# Patient Record
Sex: FEMALE | Race: OTHER | Employment: UNEMPLOYED | URBAN - METROPOLITAN AREA
[De-identification: names, ages, dates, MRNs, and addresses within clinical notes are randomized per-mention and may not be internally consistent; named-entity substitution may affect disease eponyms.]

---

## 2017-04-27 ENCOUNTER — OFFICE VISIT (OUTPATIENT)
Dept: FAMILY MEDICINE CLINIC | Age: 62
End: 2017-04-27

## 2017-04-27 ENCOUNTER — HOSPITAL ENCOUNTER (OUTPATIENT)
Dept: LAB | Age: 62
Discharge: HOME | End: 2017-04-27

## 2017-04-27 VITALS
HEART RATE: 63 BPM | BODY MASS INDEX: 29.64 KG/M2 | HEIGHT: 60 IN | DIASTOLIC BLOOD PRESSURE: 66 MMHG | WEIGHT: 151 LBS | SYSTOLIC BLOOD PRESSURE: 144 MMHG | TEMPERATURE: 98.3 F

## 2017-04-27 DIAGNOSIS — Z79.4 TYPE 2 DIABETES MELLITUS WITHOUT COMPLICATION, WITH LONG-TERM CURRENT USE OF INSULIN (HCC): ICD-10-CM

## 2017-04-27 DIAGNOSIS — R42 VERTIGO: ICD-10-CM

## 2017-04-27 DIAGNOSIS — E11.9 TYPE 2 DIABETES MELLITUS WITHOUT COMPLICATION, WITH LONG-TERM CURRENT USE OF INSULIN (HCC): ICD-10-CM

## 2017-04-27 DIAGNOSIS — R73.9 ELEVATED BLOOD SUGAR: Primary | ICD-10-CM

## 2017-04-27 DIAGNOSIS — L08.9 LOCAL SKIN INFECTION: ICD-10-CM

## 2017-04-27 LAB
EST. AVERAGE GLUCOSE BLD GHB EST-MCNC: 186 MG/DL
GLUCOSE POC: NORMAL MG/DL
HBA1C MFR BLD: 8.1 % (ref 4.2–6.3)

## 2017-04-27 PROCEDURE — 83036 HEMOGLOBIN GLYCOSYLATED A1C: CPT | Performed by: PHYSICIAN ASSISTANT

## 2017-04-27 RX ORDER — METFORMIN HYDROCHLORIDE 500 MG/1
500 TABLET ORAL 2 TIMES DAILY WITH MEALS
Qty: 60 TAB | Refills: 2 | Status: SHIPPED | OUTPATIENT
Start: 2017-04-27 | End: 2017-12-05 | Stop reason: SDUPTHER

## 2017-04-27 RX ORDER — MECLIZINE HYDROCHLORIDE 25 MG/1
25 TABLET ORAL
Qty: 30 TAB | Refills: 1 | Status: SHIPPED | OUTPATIENT
Start: 2017-04-27 | End: 2017-05-07

## 2017-04-27 RX ORDER — CEPHALEXIN 500 MG/1
500 CAPSULE ORAL 4 TIMES DAILY
Qty: 40 CAP | Refills: 0 | Status: SHIPPED | OUTPATIENT
Start: 2017-04-27 | End: 2017-05-07

## 2017-04-27 NOTE — PROGRESS NOTES
The following was performed at discharge station per provider with the assistance of , Han Negrete:    AVS printed, reviewed with pt and given to pt. RN called Raymond Whaley at [employer], and ordered more insulin for pt. Pt stated she has been buying the insulin. Yasir Escobar advised that pt has one more refill and also that pt will need to send in her updated income verification by July, 2017. Pt was advised to send a copy of her income tax return to Yasir Escobar and she was given the address to send this to. Pt has gone to registration for an appt in 3 months. Pt expressed understanding of the above information. Lacey Drummond.

## 2017-04-27 NOTE — PROGRESS NOTES
Assessment/Plan:    José Grady was seen today for diabetes. Diagnoses and all orders for this visit:    Elevated blood sugar  -     AMB POC GLUCOSE BLOOD, BY GLUCOSE MONITORING DEVICE    Type 2 diabetes mellitus without complication, with long-term current use of insulin (HCC)  -     HEMOGLOBIN A1C WITH EAG; Future  -     metFORMIN (GLUCOPHAGE) 500 mg tablet; Take 1 Tab by mouth two (2) times daily (with meals). (yaron dos veces al chiki)  -     insulin NPH (NOVOLIN N, HUMULIN N) 100 unit/mL injection; 20 units at bid    Local skin infection  -     cephALEXin (KEFLEX) 500 mg capsule; Take 1 Cap by mouth four (4) times daily for 10 days. [place] pastilla 4 veces al chiki por 10 tolliver    Vertigo  -     meclizine (ANTIVERT) 25 mg tablet; Take 1 Tab by mouth three (3) times daily as needed for up to 10 days. [place]ner pastilla cada 8 horas por swift mareo    Suspect that her a1c is probably higher than her last visit 8 months ago which may be contributing to her sxs of not feeling well  Will follow for results, may need to be seen before 3 months  Order insulin through PAP  Restart metformin  Continue lovastatin    Follow-up Disposition:  Return in about 3 months (around 7/27/2017), or if symptoms worsen or fail to improve. MONROE Burdick expressed understanding of this plan. An AVS was printed and given to the patient.      ----------------------------------------------------------------------    Chief Complaint   Patient presents with    Diabetes     f/u, med refill       History of Present Illness:  Not able to be seen for 8 months bc she would come and the clinic would already be full. Has continued taking her insulin usually 20 units bid. Night time blood sugar usually no higher than 140 and morning FBS 90's. When she is in the 90's she doesn't feel well so she holds the insulin until after breakfast and then uses it. She is off the metformin b/c she didn't have refills.  She states that she has a huge supply of lovastatin and states that she is taking this nightly. She \"doens't feel well\". For 3 months she has felt dizzy. She does have a hx of seasonal allergies for which she is not taking any medication. The dizziness is like the room is spinning around her. She continues to work full time at the [employer] and asks for me to write her a note to her boss to ensure that she has adequate breaks for snacks and checking her blood sugar, etc.     She has several spots on her legs that were first pustules (and one was on her labia but has resolved) and they are now drying up but she had induration and redness for a long time. She has one on each thigh. She states that initially they caused her thighs to be swollen. She doesn't know of any bite. She has been using purell type cleansers on them but they have not resolved. Past Medical History:   Diagnosis Date    Candidiasis of other urogenital sites 1/11/2013    Depression     past history    Joint pain 4/23/2014    Hip, left hand, ankles    Medial epicondylitis of elbow 1/11/2013    Other and unspecified hyperlipidemia 1/11/2013    Type II or unspecified type diabetes mellitus without mention of complication, not stated as uncontrolled 1/11/2013       Current Outpatient Prescriptions   Medication Sig Dispense Refill    meclizine (ANTIVERT) 25 mg tablet Take 1 Tab by mouth three (3) times daily as needed for up to 10 days. [place] 1 pastilla cada 8 horas por swift mareo 30 Tab 1    cephALEXin (KEFLEX) 500 mg capsule Take 1 Cap by mouth four (4) times daily for 10 days. [place] 1 pastilla 4 veces al chiki por 10 tolliver 40 Cap 0    metFORMIN (GLUCOPHAGE) 500 mg tablet Take 1 Tab by mouth two (2) times daily (with meals). (yaron dos veces al chiki) 60 Tab 2    insulin NPH (NOVOLIN N, HUMULIN N) 100 unit/mL injection 20 units at bid 1 Vial 0    lovastatin (MEVACOR) 20 mg tablet Take 2 Tabs by mouth nightly.  180 Tab 1    naproxen (NAPROSYN) 500 mg tablet Take 1 Tab by mouth two (2) times daily (with meals). [place] 1 [employer] veces al chiki con comida 60 Tab 1    acetaminophen (TYLENOL ARTHRITIS) 650 mg CR tablet 2 bid prn. [employer] veces al chiki si necesita para dolor. 1 Each 5       No Known Allergies    Social History   Substance Use Topics    Smoking status: Never Smoker    Smokeless tobacco: Not on file    Alcohol use No       No family history on file. Physical Exam:     Visit Vitals    /66 (BP 1 Location: Right arm, BP Patient Position: Sitting)    Pulse 63    Temp 98.3 °F (36.8 °C) (Oral)    Ht 5' 0.43\" (1.535 m)    Wt 151 lb (68.5 kg)    BMI 29.07 kg/m2     Looks well, pleasant  A&Ox3  WDWN NAD  Respirations normal and non labored  Neuro CN 2-12 grossly intact  TM's alesia with clear fluid bulge  Skin- she has one flat red macular appearing lesion on  Each anterior thigh. There is minimal induration and there is surrounding erythema. There is no acute pus.  The thighs are not obviously swollen    Lab Results   Component Value Date/Time    Glucose 89 02/18/2016 08:54 AM    Glucose  non fasting 04/27/2017 09:49 AM     Lab Results   Component Value Date/Time    Cholesterol, total 182 02/18/2016 08:54 AM    HDL Cholesterol 56 02/18/2016 08:54 AM    LDL,Direct 120 08/16/2012 12:23 PM    LDL, calculated 115 02/18/2016 08:54 AM    VLDL, calculated 11 02/18/2016 08:54 AM    Triglyceride 55 02/18/2016 08:54 AM    CHOL/HDL Ratio 3.3 02/18/2016 08:54 AM     Lab Results   Component Value Date/Time    Sodium 142 02/18/2016 08:54 AM    Potassium 4.2 02/18/2016 08:54 AM    Chloride 107 02/18/2016 08:54 AM    CO2 27 02/18/2016 08:54 AM    Anion gap 8 02/18/2016 08:54 AM    Glucose 89 02/18/2016 08:54 AM    BUN 14 02/18/2016 08:54 AM    Creatinine 0.57 02/18/2016 08:54 AM    BUN/Creatinine ratio 25 02/18/2016 08:54 AM    GFR est AA >60 02/18/2016 08:54 AM    GFR est non-AA >60 02/18/2016 08:54 AM    Calcium 8.7 02/18/2016 08:54 AM    Bilirubin, total 0.4 02/18/2016 08:54 AM    AST (SGOT) 12 02/18/2016 08:54 AM    Alk.  phosphatase 76 02/18/2016 08:54 AM    Protein, total 6.9 02/18/2016 08:54 AM    Albumin 3.9 02/18/2016 08:54 AM    Globulin 3.0 02/18/2016 08:54 AM    A-G Ratio 1.3 02/18/2016 08:54 AM    ALT (SGPT) 22 02/18/2016 08:54 AM       Lab Results   Component Value Date/Time    Hemoglobin A1c 8.8 08/23/2016 11:19 AM

## 2017-04-27 NOTE — PATIENT INSTRUCTIONS
Vértigo: Instrucciones de cuidado - [ Vertigo: Care Instructions ]  Instrucciones de cuidado  El vértigo es alise sensación de que usted o el ambiente que le rodea se mueve cuando no es así. Con frecuencia se describe jami alise sensación de girar, ryley vueltas, caer o inclinarse. El vértigo podría hacer que vomite o sienta náuseas. Podría tener dificultades para caminar o estar de pie y podría perder el equilibrio. El vértigo es a [employer] relacionado con un problema del oído interno, yuri puede tener otras causas más serias. Si el vértigo continúa, usted podría necesitar más pruebas para hallar swift causa. La atención de seguimiento es alise parte clave de swift tratamiento y seguridad. Asegúrese de hacer y acudir a todas las citas, y llame a swift médico si está teniendo problemas. También es alise buena idea saber los resultados de los exámenes y mantener alise lista de los medicamentos que karen. ¿Cómo puede cuidarse en el hogar? · No se acueste boca arriba. Elévese un poco. [place] podría reducir la sensación de girar. Mantenga los ojos abiertos. · Muévase despacio para no caer. · Si swift médico le recomienda algún medicamento, tómelo exactamente según las indicaciones. · No conduzca cuando tenga vértigo. Ciertos ejercicios, conocidos jami ejercicios de [place], pueden ayudar a disminuir el vértigo. Para hacer los ejercicios de [place]:  · Siéntese al borde de alise cama o un sofá y acuéstese rápido del lado que le causa el peor vértigo. Acuéstese de lado, sobre el oído [place]. · Quédese en demetrio posición glen por lo menos 30 segundos o hasta que el vértigo pase. · Siéntese. Si esto le causa vértigo, espere a que pase. · Repita bert procedimiento del otro lado. · Repita esto 10 veces. Francis estos ejercicios 2 veces al día hasta que la sensación de vértigo desaparezca. ¿Cuándo debe pedir ayuda? Llame al 911 en cualquier momento que considere que necesita atención de emergencia.  Por ejemplo, llame si:  · Se desmayó (perdió el conocimiento). · Tiene síntomas de un ataque cerebral. Estos podrían incluir:  ¨ Entumecimiento, hormigueo, debilitamiento o pérdida de movimiento repentinos en la sohan, el brazo o la pierna, sobre todo si ocurre en un solo lado del cuerpo. ¨ Cambios repentinos en la visión. ¨ Dificultades repentinas para hablar. ¨ Confusión repentina o dificultad para comprender frases sencillas. ¨ Problemas repentinos para caminar o mantener el equilibrio. ¨ Dolor de [place] intenso y repentino, distinto de los neftali de buck anteriores. Llame a swift médico ahora mismo o busque atención médica inmediata si:  · Tiene vértigo junto con fiebre, dolor de buck o zumbido en los oídos. · Tiene náuseas o vómito nuevos o éstos aumentan. Preste especial atención a los cambios en swift riki y asegúrese de comunicarse con swift médico si:  · El vértigo empeora u ocurre con mayor frecuencia. · El vértigo no mejora después de 2 semanas. ¿Dónde puede encontrar más información en inglés? Mandy Roy a http://my-saúl.info/. Jackson Garcia [id number] en la búsqueda para aprender más acerca de \"Vértigo: Instrucciones de cuidado - [ Vertigo: Care Instructions ]. \"  Revisado: 29 julio, 2016  Versión del contenido: 11.2  © 7904-6902 Healthwise, Incorporated. Las instrucciones de cuidado fueron adaptadas bajo licencia por [employer] (which disclaims liability or warranty for this information). Si usted tiene [place] afección médica o sobre estas instrucciones, siempre pregunte a swift profesional de riki. Healthwise, Incorporated niega toda garantía o responsabilidad por swift uso de esta información.

## 2017-04-27 NOTE — PROGRESS NOTES
Coordination of Care  1. Have you been to the ER, urgent care clinic since your last visit? Hospitalized since your last visit? No    2. Have you seen or consulted any other health care providers outside of the [address] [place] [address] since your last visit? Include any pap smears or colon screening.  No    Medications  Medication Reconciliation Performed: no  Patient does need refills     Learning Assessment Complete? yes  Results for orders placed or performed in visit on 04/27/17   AMB POC GLUCOSE BLOOD, BY GLUCOSE MONITORING DEVICE   Result Value Ref Range    Glucose  non fasting mg/dL

## 2017-05-08 ENCOUNTER — DOCUMENTATION ONLY (OUTPATIENT)
Dept: FAMILY MEDICINE CLINIC | Age: 62
End: 2017-05-08

## 2017-05-08 ENCOUNTER — TELEPHONE (OUTPATIENT)
Dept: FAMILY MEDICINE CLINIC | Age: 62
End: 2017-05-08

## 2017-05-08 NOTE — TELEPHONE ENCOUNTER
Telephone call made to the patient. Her , Que George, who is listed on the PHI, answered the call and stated the patient was at work. He offered to  the patient's insulin tomorrow at [facility] as a [facility] and knows to come between 9 am and 3:30 pm if possible.  Dora Guzman RN

## 2017-05-08 NOTE — PROGRESS NOTES
Receipt of patient's PAP insulin order of 5 vials of Humulin N has been logged into the log book. Routing to the provider for dose confirmation and permission to deliver to the patient. Estella Rutledge RN  Tracking Events for the Last 12 Hours   24 Hours   48 Hours   No recent dispensing events. insulin NPH (NOVOLIN N, HUMULIN N) 100 unit/mL injection [[id number]]   Order Details   Dose, Route, Frequency: As Directed    Dispense Quantity:  1 Vial Refills:  0 Fills Remaining:  --           Si units at bid          Written Date:  17 Expiration Date:  --     Start Date:  17 End Date:  --            Ordering Provider:  -- JULIA #:  -- NPI:  --    Authorizing Provider:   MARCOS Stewart JULIA #:  [id number] NPI:  [id number]    Diagnosis Association: Type 2 diabetes mellitus without complication, with long-term current use of insulin (HCC) (E11.9 , Z79.4)      Original Order:  insulin NPH (NOVOLIN N, HUMULIN N) 100 unit/mL injection [[id number]]      Pharmacy:  [address] #:  --     Pharmacy Comments:  --          Quantity Remaining:  -- Quantity Filled:  --        Priority and Order Details   Priority Class      Routine Normal    Warnings History   No Interaction Warnings [address],[facility], [facility] [address]   Order [place]   Number of times this order has been changed since signin   Order [address] [place]   Order History   Outpatient   Date/Time Action Taken User Additional Information   17 1040 Sign MARCOS Hernandez Reorder from Order: [id number]   17 1040 Taking [address] [place] [address] MARCOS Edwards    Destination   This Medication Went To:   [facility] [[id number]]   Medication Detail      Disp Refills Start End      insulin NPH (NOVOLIN N, HUMULIN N) 100 unit/mL injection 1 Vial 0 2017     Si units at bid    E-Prescribing Status: Receipt confirmed by pharmacy (4/27/2017 10:40 AM EDT)

## 2017-05-09 ENCOUNTER — CLINICAL SUPPORT (OUTPATIENT)
Dept: FAMILY MEDICINE CLINIC | Age: 62
End: 2017-05-09

## 2017-05-09 DIAGNOSIS — Z79.899 MEDICATION MANAGEMENT: Primary | ICD-10-CM

## 2017-05-09 NOTE — PROGRESS NOTES
Patient here to  the NPH insulin,  pap medication, patient did verbalize that she is to call [facility] for refills when she is down to two vials left, patient aware of this process. No further questions.

## 2017-11-01 ENCOUNTER — OFFICE VISIT (OUTPATIENT)
Dept: FAMILY PLANNING/WOMEN'S HEALTH CLINIC | Age: 62
End: 2017-11-01

## 2017-11-01 ENCOUNTER — HOSPITAL ENCOUNTER (OUTPATIENT)
Dept: MAMMOGRAPHY | Age: 62
Discharge: HOME | End: 2017-11-01

## 2017-11-01 ENCOUNTER — HOSPITAL ENCOUNTER (OUTPATIENT)
Dept: LAB | Age: 62
Discharge: HOME | End: 2017-11-01

## 2017-11-01 VITALS — DIASTOLIC BLOOD PRESSURE: 60 MMHG | HEART RATE: 70 BPM | SYSTOLIC BLOOD PRESSURE: 143 MMHG

## 2017-11-01 DIAGNOSIS — Z79.4 TYPE 2 DIABETES MELLITUS WITHOUT COMPLICATION, WITH LONG-TERM CURRENT USE OF INSULIN (HCC): ICD-10-CM

## 2017-11-01 DIAGNOSIS — L02.211 CUTANEOUS ABSCESS OF ABDOMINAL WALL: Primary | ICD-10-CM

## 2017-11-01 DIAGNOSIS — Z01.419 ENCOUNTER FOR WELL WOMAN EXAM: ICD-10-CM

## 2017-11-01 DIAGNOSIS — E11.9 TYPE 2 DIABETES MELLITUS WITHOUT COMPLICATION, WITH LONG-TERM CURRENT USE OF INSULIN (HCC): ICD-10-CM

## 2017-11-01 DIAGNOSIS — Z01.419 WELL WOMAN EXAM WITH ROUTINE GYNECOLOGICAL EXAM: ICD-10-CM

## 2017-11-01 DIAGNOSIS — Z12.31 VISIT FOR SCREENING MAMMOGRAM: ICD-10-CM

## 2017-11-01 PROCEDURE — 88175 CYTOPATH C/V AUTO FLUID REDO: CPT | Performed by: PHYSICIAN ASSISTANT

## 2017-11-01 PROCEDURE — 77067 SCR MAMMO BI INCL CAD: CPT

## 2017-11-01 RX ORDER — CEPHALEXIN 500 MG/1
500 CAPSULE ORAL 3 TIMES DAILY
Qty: 30 CAP | Refills: 0 | Status: SHIPPED | OUTPATIENT
Start: 2017-11-01 | End: 2018-06-09 | Stop reason: ALTCHOICE

## 2017-11-01 NOTE — Clinical Note
Please schedule pt a f/u appt at [facility], she needs to be seen for several reasons.  Thank you= may be out to a month from now

## 2017-11-01 NOTE — PROGRESS NOTES
EVERY WOMANS LIFE HISTORY QUESTIONNAIRE       No Yes Comments   Has a doctor ever seen or felt anything wrong with your breast? []                                  [x]                                  Last year   Have you ever had a breast biopsy? [x]                                  []                                          When and where was last [facility]ogram performed? [facility], [facility]    Have you ever been told that there was a problem on your mammogram?   No Yes Comments   []                                  [x]                                       Do you have breast implants? No Yes Comments   [x]                                  []                                       When was your last Pap test performed? 2016, [facility]    Have you ever had an abnormal Pap test?   No Yes Comments   [x]                                  []                                       Have you had a hysterectomy? No Yes Comments (why)   [x]                                  []                                       Have you ever been diagnosed with any type of Cancer   No Yes Comments (type,when,where,type of treatment   [x]                                  []                                          Has a family member been diagnosed with breast or ovarian cancer? No Yes Comments (which family members, and type   [x]                                  []                                       Did your mother take CASSY? No Yes Unknown   []                                  []                                  unknown     Do you have a history of HIV exposure? No Yes    [x]                                  []                                         Have you been through menopause?    No Yes Date of LMP   []                                  [x]                                  8 years ago     Are you taking hormone replacement therapy (HRT)     No Yes Comments   [x]                                  []                                       How many times have you been pregnant? 5        Number of live births ? 5    Are you experiencing any of the following? No Yes Comments   Nipple Discharge [x]                                  []                                     Breast Lump/Masses [x]                                  []                                     Breast Skin Changes [x]                                  []                                          No Yes Comments   Vaginal Discharge [x]                                  []                                     Abnormal/unusual vaginal bleeding [x]                                  []                                         Are you experiencing any other health problems? DM        Pt will need pap result letter in [address] [facility]
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
## 2017-11-07 ENCOUNTER — TELEPHONE (OUTPATIENT)
Dept: FAMILY MEDICINE CLINIC | Age: 62
End: 2017-11-07

## 2017-11-07 ENCOUNTER — DOCUMENTATION ONLY (OUTPATIENT)
Dept: FAMILY PLANNING/WOMEN'S HEALTH CLINIC | Age: 62
End: 2017-11-07

## 2017-11-07 NOTE — TELEPHONE ENCOUNTER
Per provider Sonia RODRÍGUEZ \"pt needs to return to see a medical provider\". Called placed to phone number in demographics and message left to call [facility] office nurse to schedule a f/u appt and/or return to [facility] to see a provider by \"making the line\".

## 2017-12-05 ENCOUNTER — HOSPITAL ENCOUNTER (OUTPATIENT)
Dept: LAB | Age: 62
Discharge: HOME | End: 2017-12-05

## 2017-12-05 ENCOUNTER — OFFICE VISIT (OUTPATIENT)
Dept: FAMILY MEDICINE CLINIC | Age: 62
End: 2017-12-05

## 2017-12-05 VITALS
TEMPERATURE: 97.9 F | HEART RATE: 85 BPM | BODY MASS INDEX: 29.26 KG/M2 | WEIGHT: 152 LBS | SYSTOLIC BLOOD PRESSURE: 144 MMHG | DIASTOLIC BLOOD PRESSURE: 69 MMHG

## 2017-12-05 DIAGNOSIS — E11.65 TYPE 2 DIABETES MELLITUS WITH HYPERGLYCEMIA, WITH LONG-TERM CURRENT USE OF INSULIN (HCC): Primary | ICD-10-CM

## 2017-12-05 DIAGNOSIS — Z79.4 TYPE 2 DIABETES MELLITUS WITH HYPERGLYCEMIA, WITH LONG-TERM CURRENT USE OF INSULIN (HCC): ICD-10-CM

## 2017-12-05 DIAGNOSIS — Z79.4 TYPE 2 DIABETES MELLITUS WITH HYPERGLYCEMIA, WITH LONG-TERM CURRENT USE OF INSULIN (HCC): Primary | ICD-10-CM

## 2017-12-05 DIAGNOSIS — Z13.9 ENCOUNTER FOR SCREENING: ICD-10-CM

## 2017-12-05 DIAGNOSIS — E11.65 TYPE 2 DIABETES MELLITUS WITH HYPERGLYCEMIA, WITH LONG-TERM CURRENT USE OF INSULIN (HCC): ICD-10-CM

## 2017-12-05 LAB — GLUCOSE POC: NORMAL MG/DL

## 2017-12-05 PROCEDURE — 82043 UR ALBUMIN QUANTITATIVE: CPT | Performed by: NURSE PRACTITIONER

## 2017-12-05 PROCEDURE — 83036 HEMOGLOBIN GLYCOSYLATED A1C: CPT | Performed by: NURSE PRACTITIONER

## 2017-12-05 PROCEDURE — 86803 HEPATITIS C AB TEST: CPT | Performed by: NURSE PRACTITIONER

## 2017-12-05 RX ORDER — GLIPIZIDE 5 MG/1
TABLET ORAL
Qty: 30 TAB | Refills: 3 | Status: SHIPPED | OUTPATIENT
Start: 2017-12-05 | End: 2018-02-27 | Stop reason: SDUPTHER

## 2017-12-05 RX ORDER — METFORMIN HYDROCHLORIDE 500 MG/1
500 TABLET ORAL 2 TIMES DAILY WITH MEALS
Qty: 60 TAB | Refills: 2 | Status: SHIPPED | OUTPATIENT
Start: 2017-12-05 | End: 2018-02-27 | Stop reason: SDUPTHER

## 2017-12-05 NOTE — PROGRESS NOTES
Coordination of Care  1. Have you been to the ER, urgent care clinic since your last visit? Hospitalized since your last visit? No    2. Have you seen or consulted any other health care providers outside of the [address] [place] [address] since your last visit? Include any pap smears or colon screening. No    Medications  Does the patient need refills?  YES    Learning Assessment Complete? yes  Results for orders placed or performed in visit on 12/05/17   AMB POC GLUCOSE BLOOD, BY GLUCOSE MONITORING DEVICE   Result Value Ref Range    Glucose  NF mg/dL

## 2017-12-05 NOTE — PROGRESS NOTES
Assessment/Plan:       ICD-10-CM ICD-9-CM    1. Type 2 diabetes mellitus with hyperglycemia, with long-term current use of insulin (HCC) E11.65 250.00 AMB POC GLUCOSE BLOOD, BY GLUCOSE MONITORING DEVICE    Z79.4 790.29 HEMOGLOBIN A1C WITH EAG     V58.67 MICROALBUMIN, UR, RAND W/ MICROALBUMIN/CREA RATIO      glipiZIDE (GLUCOTROL) 5 mg tablet      insulin NPH (HUMULIN N) 100 unit/mL injection      metFORMIN (GLUCOPHAGE) 500 mg tablet   2. Encounter for screening Z13.9 V82.9 HEPATITIS C AB    Greater than 50% of this 25 minute visit was spent in face-to-face counseling/coordination of care regarding diabetes management. Follow-up Disposition:  Return in about 2 months (around 2/5/2018) for diabetes. Changes made:      [address] [facility] [address], [address] [place] [address]  [address] [address]  Phone: [phone] Fax: [phone]      [facility]  Subjective:     Chief Complaint   Patient presents with    Diabetes    Medication Refill    Beronica Zaman is a 57 y.o. OTHER female who speaks Maldivian. Glipizide 10 mg taking. Bid. 30 min before breakfast and dinner. Just today she ran out of insulin - last night and today. Eyes feel \"rare\" which she describes as watery at times. Work (in or outside the home): yes   Physical Activity in addition to working? no  Measuring glucoses? yes 105; that was at 4 am.  At night, 105.  140 in the afternoon. no rice, no soda, no bread. 170 in the evening. The pills do not lower her glucose. Outpatient Medications Prior to Visit   Medication Sig Dispense Refill    insulin NPH (HUMULIN N) 100 unit/mL injection Taking differently: 25 units sc qhs 20 Units by SubCUTAneous route two (2) times a day. Every morning and at bedtime 10 Vial 0    cephALEXin (KEFLEX) 500 mg capsule Take 1 Cap by mouth three (3) times daily.  Gave pt a hand written rx bc at [facility] 30 Cap 0    metFORMIN (GLUCOPHAGE) 500 mg tablet Take 1 Tab by mouth two (2) times daily (with meals). (yaron dos veces al chiki) 60 Tab 2    lovastatin (MEVACOR) 20 mg tablet Take 2 Tabs by mouth nightly. 180 Tab 1    naproxen (NAPROSYN) 500 mg tablet Take 1 Tab by mouth two (2) times daily (with meals). [place] 1 [facility] [employer] veces al chiki con comida 60 Tab 1    acetaminophen (TYLENOL ARTHRITIS) 650 mg CR tablet 2 bid prn. [employer] veces al chiki si necesita para dolor. 1 Each 5   Used Arnica leaves as a tea. No facility-administered medications prior to visit. Brought in medications? no and not all of them. Taking medications as prescribed? no (did not complete the cephalexin; taking insulin differently)  ROS:   no polyuria or polydipsia, no chest pain, dyspnea or TIA's, no numbness, tingling or pain in extremities. At times notices watery eyes. Social History: She reports that she has never smoked. She has never used smokeless tobacco. She reports that she does not drink alcohol or use illicit drugs. Family History: Her family history is not on file. Surgical History:   Past Surgical History:   Procedure Laterality Date    HX APPENDECTOMY      HX TUBAL LIGATION       Objective:     Vitals:    12/05/17 1354   BP: 144/69   Pulse: 85   Temp: 97.9 °F (36.6 °C)   TempSrc: Oral   Weight: 152 lb (68.9 kg)    No LMP recorded. Patient is postmenopausal.  Results for orders placed or performed in visit on 12/05/17   AMB POC GLUCOSE BLOOD, BY GLUCOSE MONITORING DEVICE   Result Value Ref Range    Glucose  NF mg/dL    She used her daughter's metformin and glipizide and these didn't lower her glucose. No insulin for a week. Wt Readings from Last 2 Encounters:   12/05/17 152 lb (68.9 kg)   04/27/17 151 lb (68.5 kg)    Weight increased; Hemoglobin A1c   Date Value Ref Range Status   04/27/2017 8.1 (H) 4.2 - 6.3 % Final   08/23/2016 8.8 (H) 4.2 - 6.3 % Final    A1C decreased;   Using 25 units at night only, 5 or 7 pm.  Not taking any sodas.     Lab Results   Component Value Date/Time    Microalbumin/Creat ratio (mg/g creat) 13 10/29/2015 10:03 AM    Microalbumin,urine random 0.56 10/29/2015 10:03 AM    Creatinine 0.57 02/18/2016 08:54 AM      Lab Results   Component Value Date/Time    GFR est AA >60 02/18/2016 08:54 AM    GFR est non-AA >60 02/18/2016 08:54 AM       Lab Results   Component Value Date/Time    Cholesterol, total 182 02/18/2016 08:54 AM    HDL Cholesterol 56 02/18/2016 08:54 AM    LDL,Direct 120 08/16/2012 12:23 PM    LDL, calculated 115 02/18/2016 08:54 AM    Triglyceride 55 02/18/2016 08:54 AM    CHOL/HDL Ratio 3.3 02/18/2016 08:54 AM      Lab Results   Component Value Date/Time    ALT (SGPT) 22 02/18/2016 08:54 AM    AST (SGOT) 12 02/18/2016 08:54 AM    Alk. phosphatase 76 02/18/2016 08:54 AM    Bilirubin, total 0.4 02/18/2016 08:54 AM     Constitutional: She appears well-developed. Eyes: EOM are normal. Pupils are equal, round, and reactive to light. Neck: Neck supple. No thyromegaly present. Cardiovascular: Normal rate, regular rhythm, normal heart sounds and intact distal pulses. No murmur heard. Pulmonary/Chest: Effort normal and breath sounds normal.   Musculoskeletal: She exhibits no edema. No ulcers of the lower extremities. Assessment/Plan:   Diagnoses and all orders for this visit:    1. Type 2 diabetes mellitus with hyperglycemia, with long-term current use of insulin (HCC)  -     AMB POC GLUCOSE BLOOD, BY GLUCOSE MONITORING DEVICE  -     HEMOGLOBIN A1C WITH EAG; Future  -     MICROALBUMIN, UR, RAND W/ MICROALBUMIN/CREA RATIO; Future  -     glipiZIDE (GLUCOTROL) 5 mg tablet; 1 po qday 30 min ac lunch; Shayy 1 por boca 30 min antes de almuerzo  -     insulin NPH (HUMULIN N) 100 unit/mL injection; 25 Units by SubCUTAneous route nightly. Trinidadian sig  -     metFORMIN (GLUCOPHAGE) 500 mg tablet; Take 1 Tab by mouth two (2) times daily (with meals). (yaron dos veces al chiki)    2. Encounter for screening  -     HEPATITIS C AB;  Future      Diabetes Mellitus type 2, under Poor control. Blood pressure under Fair control. Greater than 50% of this 25 minute visit was spent in face-to-face counseling/coordination of care regarding diabetes management. Follow-up Disposition:  Return in about 2 months (around 2/5/2018) for diabetes. Changes made:   Lyla Bateman DNP, FNP-BC, BC-ADM  Amrit Patrick expressed understanding of this plan. Massimo Aponte is her daughter. Daughter doesn't have much hunger.

## 2017-12-05 NOTE — PROGRESS NOTES
Printed AVS, provided to pt and reviewed. Pt indicated understanding and had no questions. Told pt that rx's have been sent to pharmacy and they should be ready for  in approximately 2 hrs. All the medications ordered today were reviewed with the pt. The pt stated she used to get her insulin for free. She had not received any insulin from the PAP program since April. The pt stated she had bought 1 bottle of insulin. The pt was given one vial of stock insulin NPH. The dose was reviewed with the pt. PAP Coordinator Abhi Starks RN, was notified. Estella emailed Anabelle Pantoja for a refill on the pt's insulin. The pt was instructed she was supposed to call Anabelle Pantoja at the PAP program and let her know when she needed more insulin before she ran out of insulin. At least 4-6 weeks prior to running out of insulin. The pt was instructed to finish her Keflex she had at home. Per the provider she is to take 1 capsule 3 times a day by mouth until they are gone. Pt verbalized understanding and denied further questions. Thong Marquis was the .  Felicitas Rolon RN

## 2017-12-06 LAB
CREAT UR-MCNC: 43.4 MG/DL
EST. AVERAGE GLUCOSE BLD GHB EST-MCNC: 151 MG/DL
HBA1C MFR BLD: 6.9 % (ref 4.2–6.3)
HCV AB SERPL QL IA: NONREACTIVE
HCV COMMENT,HCGAC: NORMAL
MICROALBUMIN UR-MCNC: <0.5 MG/DL
MICROALBUMIN/CREAT UR-RTO: NORMAL MG/G (ref 0–30)

## 2017-12-06 NOTE — PROGRESS NOTES
A1c at goal; this is consistent with her measurements. No change to plan. Check cbc at follow up, review POC measurements after lunch to make sure glipizide 5mg not causing lows.

## 2017-12-26 ENCOUNTER — DOCUMENTATION ONLY (OUTPATIENT)
Dept: FAMILY MEDICINE CLINIC | Age: 62
End: 2017-12-26

## 2017-12-26 NOTE — PROGRESS NOTES
Receipt of patient's PAP insulin order of 5 vials of Humulin N, 100/ml, 10ml/vial, has been logged into the log book. Routing to the provider for dose confirmation and permission to deliver to the patient. Estella Marc RN    insulin NPH (HUMULIN N) 100 unit/mL injection [[id number]]   Order Details   Dose: 25 Units Route: SubCUTAneous Frequency: EVERY BEDTIME   Dispense Quantity:  10 Vial Refills:  0 Fills Remaining:  --           Si Units by SubCUTAneous route nightly.  Kyrgyz sig          Written Date:  17 Expiration Date:  --     Start Date:  17 End Date:  --            Ordering Provider:  -- JULIA #:  -- NPI:  --    Authorizing Provider:  Jarret Carrillo NP JULIA #:  [id number] NPI:  [id number]    Ordering User:  Jarret Carrillo NP            Diagnosis Association: Type 2 diabetes mellitus with hyperglycemia, with long-term current use of insulin (HCC) (E11.65 , Z79.4)

## 2018-01-03 NOTE — PROGRESS NOTES
Telephone call made to the patient with assistance from [employer]  # [id number]. Calling to schedule delivery of her PAP insulin order of 5 vials of Humulin N. There was no answer at her home/cell phone number, so a generic message was left asking her to please call the [facility] office. We then called her daughter, Jody Castillo, [phone], as listed on the [address] [place] [address] Road form. There was no answer and no opportunity to leave a message. Estella Howard RN           Dose confirmed, please deliver  Received: 1 week ago       VESNA Eckert RN       Caller: Unspecified (1 week ago)                       Previous Messages       ----- Message -----      From: Estephania Zhou RN      Sent: 12/26/2017  10:59 AM        To:  Brian Banks NP

## 2018-01-12 NOTE — PROGRESS NOTES
RN called pt with the assistance of  # [phone]. Pt would like to  her medication on 1/16/18 at [address] [place] [address]. Ainsley Jiménez Sending email to Britta Lopez RN, and Evette Ventura, nursing supervisor, to advise of pickup date.   Ras Jones RN

## 2018-01-16 ENCOUNTER — OFFICE VISIT (OUTPATIENT)
Dept: FAMILY MEDICINE CLINIC | Age: 63
End: 2018-01-16

## 2018-01-16 VITALS
SYSTOLIC BLOOD PRESSURE: 136 MMHG | BODY MASS INDEX: 29.45 KG/M2 | TEMPERATURE: 97.7 F | WEIGHT: 150 LBS | HEART RATE: 68 BPM | HEIGHT: 60 IN | DIASTOLIC BLOOD PRESSURE: 57 MMHG

## 2018-01-16 DIAGNOSIS — Z13.9 ENCOUNTER FOR SCREENING: ICD-10-CM

## 2018-01-16 DIAGNOSIS — Z23 ENCOUNTER FOR IMMUNIZATION: ICD-10-CM

## 2018-01-16 DIAGNOSIS — G56.02 CARPAL TUNNEL SYNDROME OF LEFT WRIST: ICD-10-CM

## 2018-01-16 DIAGNOSIS — Z79.4 TYPE 2 DIABETES MELLITUS WITH HYPERGLYCEMIA, WITH LONG-TERM CURRENT USE OF INSULIN (HCC): Primary | ICD-10-CM

## 2018-01-16 DIAGNOSIS — E11.65 TYPE 2 DIABETES MELLITUS WITH HYPERGLYCEMIA, WITH LONG-TERM CURRENT USE OF INSULIN (HCC): Primary | ICD-10-CM

## 2018-01-16 PROBLEM — F33.9 RECURRENT DEPRESSION (HCC): Status: ACTIVE | Noted: 2018-01-16

## 2018-01-16 LAB
GLUCOSE POC: NORMAL MG/DL
HGB BLD-MCNC: 11.6 G/DL

## 2018-01-16 NOTE — MR AVS SNAPSHOT
[address] [facility] 
 
 
 [address] [facility] [address] 
[phone] Patient: Chriss Griffin MRN: [id number] CMI:5/55/1804 Visit Information Minerva hu Ferrelladiapranav Personal Médico Departamento Teléfono del Dep. Número de visita 1/16/2018  8:30 AM [facility] [phone] Follow-up Instructions Return in about 6 weeks (around 2/27/2018) for diabetes, left hand pain. Your Appointments 2/27/2018 12:30 PM  
[facility] Appt Note: Follow up Andrew 66 per LK; by n [address] [place] [address] [address] [place] [address]  
[phone]  
  
   
 [address] [place] [address] Upcoming Health Maintenance Date Due  
 EYE EXAM RETINAL OR DILATED Q1 5/27/1963 DTaP/Tdap/Td series (1 - Tdap) 5/27/1974 FOBT Q 1 YEAR AGE 50-75 5/27/2003 ZOSTER VACCINE AGE 60> 3/27/2013 LIPID PANEL Q1 2/18/2017 Influenza Age 5 to Adult 8/1/2017 FOOT EXAM Q1 8/23/2017 HEMOGLOBIN A1C Q6M 6/5/2018 MICROALBUMIN Q1 12/5/2018 BREAST CANCER SCRN MAMMOGRAM 11/1/2019 PAP AKA CERVICAL CYTOLOGY 11/1/2020 Alergias  Review Complete El: 1/16/2018 Por: VESNA Mosquera del:  1/16/2018 No Known Allergies Vacunas actuales Revisadas el:  6/30/2016 Alroy Randee Influenza Vaccine 3/1/2014, 1/8/2013 Influenza Vaccine (Quad) PF 11/19/2015, 11/4/2014 Pneumococcal Polysaccharide (PPSV-23) 6/30/2016 No revisadas esta visita You Were Diagnosed With   
  
 Sandhya Coho Type 2 diabetes mellitus with hyperglycemia, with long-term current use of insulin (HCC)    -  Primary ICD-10-CM: E11.65, Z79.4 ICD-9-CM: 250.00, 790.29, V58.67 Encounter for screening     ICD-10-CM: Z13.9 ICD-9-CM: V82.9  Carpal tunnel syndrome of left wrist     ICD-10-CM: G56.02 
 ICD-9-CM: 354.0 Partes vitales PS Pulso Temperatura Cameron ( percentil de crecimiento) Peso (percentil de crecimiento) BMI (IMC)  
 136/57 (BP 1 Location: Left arm, BP Patient Position: Sitting) 68 97.7 °F (36.5 °C) (Oral) 4' 11.65\" (1.515 m) 150 lb (68 kg) 29.64 kg/m2 Estado obstétrico Estatus de tabaquísmo Postmenopausal Never Smoker Historial de signos vitales BMI and BSA Data Body Mass Index Body Surface Area  
 29.64 kg/m 2 1.69 m 2 [facility] Phone [address] [phone] Switf lista de medicamentos actualizada Lista actualizada el: 1/16/18  9:38 AM.  Yariel Treasure use swift lista de medicamentos más reciente. acetaminophen 650 mg Tber También conocido jami:  TYLENOL ARTHRITIS  
2 bid prn. [employer] veces al chiki si necesita para dolor. cephALEXin 500 mg capsule También conocido jami:  Poly Boyers Take 1 Cap by mouth three (3) times daily. Gave pt a hand written rx bc at [facility]  
  
 glipiZIDE 5 mg tablet También conocido jami:  Marti Lakisha 1 po qday 30 min ac lunch; Shayy 1 por boca 30 min antes de almuerzo  
  
 insulin  unit/mL injection También conocido jami:  HumuLIN N  
25 Units by SubCUTAneous route nightly. Belarusian sig  
  
 lovastatin 20 mg tablet También conocido jami:  MEVACOR Take 2 Tabs by mouth nightly. metFORMIN 500 mg tablet También conocido jami:  GLUCOPHAGE Take 1 Tab by mouth two (2) times daily (with meals). (yaron dos veces al chiki)  
  
 naproxen 500 mg tablet También conocido jami:  NAPROSYN Take 1 Tab by mouth two (2) times daily (with meals). [place] 1 [employer] veces al chiki con comida Hicimos lo siguiente AMB POC GLUCOSE BLOOD, BY GLUCOSE MONITORING DEVICE [00539 CPT(R)] AMB POC HEMOGLOBIN (HGB) [12253 CPT(R)] Instrucciones de seguimiento Return in about 6 weeks (around 2/27/2018) for diabetes, left hand pain. Instrucciones para el Paciente Carpal Tunnel Wrist Splint, Left Hand 
  
Síndrome del jeanette white: Instrucciones de cuidado - [ Carpal Tunnel Syndrome: Care Instructions ] Instrucciones de cuidado El síndrome del jeanette white es un problema nervioso. Puede causar hormigueo, entumecimiento, debilidad o [employer] dedos, el pulgar y la [place]. El nervio mediano y varios tejidos fibrosos, llamados tendones, atraviesan la lamonte por un espacio denominado jeanette carpalejandra. El movimiento repetido de las tre al trabajar o practicar ciertos pasatiempos y deportes puede hacer presión sobre el nervio. El embarazo y ciertas afecciones médicas, jami la diabetes, la artritis y Esta Froid tiroides hipoactiva, también pueden causar el síndrome del jeanette white. Para reducir los síntomas, usted podría limitar [facility]. También puede dallas otras medidas para sentirse mejor. Si los síntomas son leves, es probable que pueda aliviar el dolor con 1 o 2 semanas de tratamiento en el hogar. La cirugía es necesaria solo si otros tratamientos no funcionan. La atención de seguimiento es alise parte clave de swift tratamiento y seguridad. Asegúrese de hacer y acudir a todas las citas, y llame a swift médico si está teniendo problemas. También es alise buena idea saber los resultados de los exámenes y mantener alise lista de los medicamentos que karen. Cómo puede cuidarse en el hogar? · Si es posible, interrumpa o disminuya la actividad que causa los síntomas. Si no puede suspenderla, jeremy pausas frecuentes para descansar y estirarse o cambie la posición de las tre para hacer alise tarea. Trate de Albanian [facility], jami cuando [place] el ratón de alise computadora. · Trate de evitar doblar o rotar las muñecas.  
· Pregúntele a swift médico si puede dallas un analgésico (medicamento para el dolor) de venta justin, jami acetaminofén (Tylenol), ibuprofeno (Advil, Motrin) o naproxeno (Aleve). Sea chico con los medicamentos. Reina y siga todas las instrucciones de la [place]. · Si swift médico le receta corticosteroides para ayudar a aliviar el dolor y reducir la hinchazón, tómelos exactamente jami le fueron recetados. Llame a swift médico si angel estar teniendo problemas con swift medicamento. · Colóquese hielo o alise compresa fría sobre la [place] de 10 a 20 minutos cada vez para aliviar el dolor. Póngase un paño ahuja entre el hielo y la piel. · Si swift médico o swift fisioterapeuta o terapeuta ocupacional le indica que use alise tablilla (férula) para la [place], [place] según las indicaciones para mantener la [place] en alise posición neutra. [place] también reduce la presión sobre swift nervio mediano. · Pregúntele a swift médico si debería hacer sesiones de fisioterapia o de terapia ocupacional para aprender a hacer las tareas de [employer]. · Buell clases de yoga para estirar los músculos y fortalecer las tre y las Farrah. El yoga ha [employer] síntomas del túnel carpiano. Cómo prevenir el síndrome del túnel carpiano · Cuando trabaje en la computadora, [employer] y las muñecas alineadas con los antebrazos. Mantenga los codos cerca de seven costados. Buell un descanso con alise frecuencia de 10 a 15 minutos. · Trate de hacer los siguientes ejercicios: 
¨ Calentamiento: Gire la lamonte hacia arriba, Chayo Edita y de un lado a otro. Repita esto 4 veces. Estire [employer] dedos, relájelos y vuelva a estirarlos. Repita 4 veces. Estire el pulgar doblándolo levemente hacia atrás, manténgalo en demetrio posición y relájelo. Repita 4 veces. ¨ Estiramiento con los [employer] en posición de orar: Comience colocando las margaret de las tre juntas lalo del pecho, maggi debajo del [place].  Baje las tre lentamente hacia la línea de la cintura y [address] del estómago con las margaret juntas hasta que sienta un estiramiento leve a moderado debajo de los antebrazos. Mantenga la posición entre 10 y 21 segundos. Repita 4 veces. ¨ Estiramiento del flexor de la lamonte: Extienda el brazo frente a usted, con la sales [place] arriba. Doble la lamonte y apunte con la mano hacia el piso. Con la [employer], doble con suavidad la lamonte aún más hasta que sienta un estiramiento entre leve y moderado en el antebrazo. Mantenga la posición entre 10 y 21 segundos. Repita 4 veces. ¨ Estiramiento del extensor de la lamonte: Repita los pasos para el estiramiento del flexor de la lamonte yuri comience con la sales extendida Kjarad K. · Apriete alise pelota de goma varias veces al día para mantener andreia las tre y los dedos. · Evite sostener objetos (jami un libro) en la misma posición glen mucho tiempo. Siempre que sea posible, utilice toda la mano para dallas un objeto. Si [place] solo el pulgar y el dedo índice puede tensionar la [place]. · No fume. Puede empeorar esta afección ya que reduce el flujo de curt hacia el nervio El paso. Si necesita ayuda para dejar de fumar, hable con swift médico sobre programas y medicamentos para dejar de fumar. Estos pueden aumentar seven probabilidades de dejar el hábito para siempre. Cuándo debe pedir ayuda? Preste especial atención a los cambios de swift riki y asegúrese de comunicarse con swift médico si: 
? · El dolor u otros problemas no mejoran con los cuidados en el hogar. ? · Desea más información sobre la fisioterapia o la terapia ocupacional.  
? · Tiene efectos secundarios producidos por los corticosteroides, tales jami: ¨ Aumento de [place]. ¨ Cambios en el estado de ánimo. ¨ Problemas para dormir. ¨ Fácil formación de moretones. ? · Tiene otros problemas con los medicamentos. Dónde puede encontrar más información en inglés? Sylvie Gonzalez a [email or web address]-[email or web address].[email or web address] Kristian Matter R432 en la búsqueda para aprender más acerca de \"Síndrome del túnel rosettaiano: Instrucciones de cuidado - [ Carpal Tunnel Syndrome: Care Instructions ]. \" 
Revisado: 21 marzo, 2017 Versión del contenido: 11.4 © 1156-4543 Healthwise, Incorporated. Las instrucciones de cuidado fueron adaptadas bajo licencia por Good Help [employer]ctions (which disclaims liability or warranty for this information). Si usted tiene [place] afección médica o sobre estas instrucciones, siempre pregunte a swift profesional de riki. Healthwise, Incorporated niega toda garantía o responsabilidad por swift uso de esta información. Introducing [facility]! [facility] introduce portal paciente MyChart . Ahora se puede acceder a partes de swift expediente médico, enviar por correo electrónico la oficina de swift médico y solicitar renovaciones de medicamentos en línea. En swift navegador de Internet , Steff lopez [email or web address] Jeremy clic en el usuario por Elysia Finders? Radha Sidle clic aquí en la sesión [facility]. Verá la página de registro [place]. Ingrese swift código de Bank [employer] Kalani blane y jami aparece a continuación. Usted no tendrá que [employer] código después de salvador completado el proceso de registro . Si usted no se inscribe antes de la fecha de caducidad , debe solicitar un nuevo código. · MyChart Código de acceso : [id number] Expires: 4/16/2018  9:38 AM 
 
Ingresa los últimos cuatro dígitos de swift Número de Seguro Social ( xxxx ) y fecha de nacimiento ( dd / mm / aaaa ) jami se indica y jeremy clic en Enviar. ted será llevado a la siguiente página de registro . Crear un ID MyChart . Esta será swift ID de inicio de sesión de MyChart y no puede ser [place] , por lo que pensar en alise que es Rosalie Claw y fácil de recordar . Crear alise contraseña MyChart . Usted puede cambiar swift contraseña en cualquier momento . Ingrese swift Password Reset de preguntas y Funes .  Nibbe se puede utilizar en un momento posterior si usted olvida swift contraseña. Introduzca swift dirección de correo electrónico . Darrelyn Dienes recibirá alise notificación por correo electrónico cuando la nueva información está disponible en MyChart . Kirsty Keeneet clic en Registrarse. Adis Bark wanda y descargar porciones de swift expediente médico. 
Francis clic en el enlace de descarga del menú Resumen para descargar alise copia portátil de swift información médica . Si tiene [employer] , por favor visite la sección de preguntas frecuentes del sitio web MyChart . Recuerde, [facility] Center[facility] NO es que se utilizará para las necesidades urgentes. Para emergencias médicas , llame al 911 . Ahora disponible en swift iPhone y Android ! Por favor proporcione bert resumen de la documentación de cuidado a swift próximo proveedor. Your primary care clinician is listed as Brandon Henry. If you have any questions after today's visit, please call [phone].

## 2018-01-16 NOTE — PROGRESS NOTES
Avs printed, reviewed and given. Per Charla Mahajan N.P. Order to administer influenza and prevnar 13 given. Unable to administer Prevnar 13 today due to no Prevnar 13 in stock today. Provider was notified. Nurse copy [facility] paperwork and will send to office to be scan in to patient 's chart, originals were given back to patient. Patient aware that she should return in about 6 weeks for f/u with an appointment. Appointment has been already scheduled by patient and is noted on her AVS which she can show to her employer. Patient is now waiting for her pap insulin. Patient requested the influenza vaccine. Vaccination consent filled and signed by patient. No contraindications to the vaccine. VIS statement given and reviewed. Potential side effects reviewed. Reviewed reasons to seek emergency assistance. Influenza vaccine given per protocol, policy and N.P. order. Entered in [address] [place]. Patient verbalized understanding, and does not have any questions or concerns. Patient waited in the clinic for 15 minutes, patient did not show s/s of allergic reaction at discharge. Patient tolerated procedure well, no complications noted. Patient verbalized understanding. Mitali Avila, RN       1112 PAP insulin given (5 vials of 10ml of Humulin N, Exp. Date checked and within date. ) to patient; Insulin dose reviewed. PAP insulin  slip sign by patient. Patient aware that she should be doing Humulin N 25 units once at bedtime. Discussed with patient importance of following the N.P. Order and recording and bringing blood sugar log to office visit. S/s of hypoglycemia discussed and steps to do if that happens  Reviewed with patient.

## 2018-01-16 NOTE — PROGRESS NOTES
Assessment/Plan:       ICD-10-CM ICD-9-CM    1. Type 2 diabetes mellitus with hyperglycemia, with long-term current use of insulin (HCC) E11.65 250.00 AMB POC GLUCOSE BLOOD, BY GLUCOSE MONITORING DEVICE    Z79.4 790.29      V58.67    2. Encounter for screening Z13.9 V82.9 AMB POC HEMOGLOBIN (HGB)   3. Carpal tunnel syndrome of left wrist G56.02 354.0    4. Encounter for immunization Z23 V03.89 INFLUENZA VIRUS VAC QUAD,SPLIT,PRESV FREE SYRINGE IM    Greater than 50% of this 25 minute visit was spent in face-to-face counseling/coordination of care regarding diabetes management. Follow-up Disposition:  Return in about 6 weeks (around 2/27/2018) for diabetes, left hand pain. Plan:  OK for flu vaccine; needs a note stating the maureen of her next appointment to show her boss; please copy FMLA forms; she is picking up insulin, continue 25 units hs; please print AVS      [facility], [address] [place] [address] E Call  [address]  Phone: [phone] Fax: [phone]      [address]   Subjective:     Chief Complaint   Patient presents with    Diabetes     follow up    Medication Management     Needs to  insulin    Immunization/Injection    Chris Boston is a 57 y.o. OTHER female who speaks Malaysian.  used? Yes, Radha Funk Was called to  insulin today. Diabetes   Brought in medications? no 2 am, 124; went to bed at 7 pm.  Had to make the line at [facility] today. Her boss is not letting her go to her appointments for her health. Working:  yes   Measuring glucoses? Yes; fasting 124; Other problems   Today's focus was on completing FMLA form so that she can attend this and future appointments for her diabetes. Outpatient Medications Prior to Visit   Medication Sig Dispense Refill    glipiZIDE (GLUCOTROL) 5 mg tablet 1 po qday 30 min ac lunch;  Shayy 1 por boca 30 min antes de almuerzo 30 Tab 3    insulin NPH (HUMULIN N) 100 unit/mL injection 25 Units by SubCUTAneous route nightly. Lithuanian sig 10 Vial 0    metFORMIN (GLUCOPHAGE) 500 mg tablet Take 1 Tab by mouth two (2) times daily (with meals). (yaron dos veces al chiki) 60 Tab 2    cephALEXin (KEFLEX) 500 mg capsule Take 1 Cap by mouth three (3) times daily. Gave pt a hand written rx bc at [facility] 30 Cap 0    lovastatin (MEVACOR) 20 mg tablet Take 2 Tabs by mouth nightly. 180 Tab 1    naproxen (NAPROSYN) 500 mg tablet Take 1 Tab by mouth two (2) times daily (with meals). [place] 1 [employer] veces al chiki con comida 60 Tab 1    acetaminophen (TYLENOL ARTHRITIS) 650 mg CR tablet 2 bid prn. [employer] veces al chiki si necesita para dolor. 1 Each 5     No facility-administered medications prior to visit. ROS:   no polyuria or polydipsia, no chest pain, dyspnea or TIA's, no numbness, tingling or pain in extremities. Social History: She reports that she has never smoked. She has never used smokeless tobacco. She reports that she does not drink alcohol or use illicit drugs. Family History: Her family history is not on file. Surgical History:   Past Surgical History:   Procedure Laterality Date    HX APPENDECTOMY      HX TUBAL LIGATION       Objective:     Vitals:    01/16/18 0850   BP: 136/57   Pulse: 68   Temp: 97.7 °F (36.5 °C)   TempSrc: Oral   Weight: 150 lb (68 kg)   Height: 4' 11.65\" (1.515 m)    No LMP recorded. Patient is postmenopausal.  Results for orders placed or performed in visit on 01/16/18   AMB POC GLUCOSE BLOOD, BY GLUCOSE MONITORING DEVICE   Result Value Ref Range    Glucose  fasting mg/dL   AMB POC HEMOGLOBIN (HGB)   Result Value Ref Range    Hemoglobin (POC) 11.6       Wt Readings from Last 2 Encounters:   01/16/18 150 lb (68 kg)   12/05/17 152 lb (68.9 kg)    Weight decreased;    Hemoglobin A1c   Date Value Ref Range Status   12/05/2017 6.9 (H) 4.2 - 6.3 % Final   04/27/2017 8.1 (H) 4.2 - 6.3 % Final    A1C decreased;   Lab Results   Component Value Date/Time Microalbumin/Creat ratio (mg/g creat)  12/05/2017 03:28 PM     Cannot calculate ratio due to microalbumin result outside reportable range. Microalbumin,urine random <0.50 12/05/2017 03:28 PM    Creatinine 0.57 02/18/2016 08:54 AM      Lab Results   Component Value Date/Time    GFR est AA >60 02/18/2016 08:54 AM    GFR est non-AA >60 02/18/2016 08:54 AM       Lab Results   Component Value Date/Time    Cholesterol, total 182 02/18/2016 08:54 AM    HDL Cholesterol 56 02/18/2016 08:54 AM    LDL,Direct 120 08/16/2012 12:23 PM    LDL, calculated 115 02/18/2016 08:54 AM    Triglyceride 55 02/18/2016 08:54 AM    CHOL/HDL Ratio 3.3 02/18/2016 08:54 AM      Lab Results   Component Value Date/Time    ALT (SGPT) 22 02/18/2016 08:54 AM    AST (SGOT) 12 02/18/2016 08:54 AM    Alk. phosphatase 76 02/18/2016 08:54 AM    Bilirubin, total 0.4 02/18/2016 08:54 AM     Constitutional: She appears well-developed. Eyes: EOM are normal. Pupils are equal, round, and reactive to light. Neck: Neck supple. No thyromegaly present. Cardiovascular: Normal rate, regular rhythm, normal heart sounds and intact distal pulses. No murmur heard. Pulmonary/Chest: Effort normal and breath sounds normal.   Musculoskeletal: She exhibits no edema. No ulcers of the lower extremities. Assessment/Plan:   Diagnoses and all orders for this visit:    1. Type 2 diabetes mellitus with hyperglycemia, with long-term current use of insulin (HCC)  -     AMB POC GLUCOSE BLOOD, BY GLUCOSE MONITORING DEVICE    2. Encounter for screening  -     AMB POC HEMOGLOBIN (HGB)    3. Carpal tunnel syndrome of left wrist    4. Encounter for immunization  -     Influenza virus vaccine (QUADRIVALENT PRES FREE SYRINGE) IM ([id number])      Diabetes Mellitus type 2, under uncertain control. Blood pressure under Good control. Greater than 50% of this 25 minute visit was spent in face-to-face counseling/coordination of care regarding diabetes management.   Follow-up Disposition:  Return in about 6 weeks (around 2/27/2018) for diabetes, left hand pain. Today's focus was on completing FMLA form so that she can attend this and future appointments for her diabetes. Charles Gallegos, DNP, FNP-BC, BC-ADM  Lalito Hatfield expressed understanding of this plan.

## 2018-01-16 NOTE — PROGRESS NOTES
Coordination of Care  1. Have you been to the ER, urgent care clinic since your last visit? Hospitalized since your last visit? No    2. Have you seen or consulted any other health care providers outside of the [address] [place] [address] since your last visit? Include any pap smears or colon screening. No    Medications  Does the patient need refills?  YES    Learning Assessment Complete? yes    Results for orders placed or performed in visit on 01/16/18   AMB POC GLUCOSE BLOOD, BY GLUCOSE MONITORING DEVICE   Result Value Ref Range    Glucose  fasting mg/dL   AMB POC HEMOGLOBIN (HGB)   Result Value Ref Range    Hemoglobin (POC) 11.6

## 2018-01-16 NOTE — PATIENT INSTRUCTIONS
Carpal Tunnel Wrist Splint, Left Hand     Síndrome del jeanette white: Instrucciones de cuidado - [ Carpal Tunnel Syndrome: Care Instructions ]  Instrucciones de cuidado    El síndrome del jeanette white es un problema nervioso. Puede causar hormigueo, entumecimiento, debilidad o [employer] dedos, el pulgar y la Leonard. El nervio mediano y varios tejidos fibrosos, llamados tendones, atraviesan la lamonte por un espacio denominado jeanette white. El movimiento repetido de las tre al trabajar o practicar ciertos pasatiempos y deportes puede hacer presión sobre el nervio. El embarazo y ciertas afecciones médicas, jami la diabetes, la artritis y Marck Double tiroides hipoactiva, también pueden causar el síndrome del jeanette white. Para reducir los síntomas, usted podría limitar [facility] o [facility]. También puede dallas otras medidas para sentirse mejor. Si los síntomas son leves, es probable que pueda aliviar el dolor con 1 o 2 semanas de tratamiento en el hogar. La cirugía es necesaria solo si otros tratamientos no funcionan. La atención de seguimiento es alise parte clave de swift tratamiento y seguridad. Asegúrese de hacer y acudir a todas las citas, y llame a swift médico si está teniendo problemas. También es alise buena idea saber los resultados de los exámenes y mantener alise lista de los medicamentos que karen. ¿Cómo puede cuidarse en el hogar? · Si es posible, interrumpa o disminuya la actividad que causa los síntomas. Si no puede suspenderla, jeremy pausas frecuentes para descansar y estirarse o cambie la posición de las tre para hacer alise tarea. Trate de [facility], jami cuando [place] el ratón de alise computadora. · Trate de evitar doblar o rotar las muñecas. · Pregúntele a swift médico si puede dallas un analgésico (medicamento para el dolor) de venta justin, jami acetaminofén (Tylenol), ibuprofeno (Advil, Motrin) o naproxeno (Aleve). Sea chico con los medicamentos.  Reina y siga todas las instrucciones de la etiqueta. · Si swift médico le receta corticosteroides para ayudar a aliviar el dolor y reducir la hinchazón, tómelos exactamente jami le fueron recetados. Llame a swift médico si angel estar teniendo problemas con swift medicamento. · Colóquese hielo o alise compresa fría sobre la [place] de 10 a 20 minutos cada vez para aliviar el dolor. Póngase un paño ahuja entre el hielo y la piel. · Si swift médico o swift fisioterapeuta o terapeuta ocupacional le indica que use alise tablilla (férula) para la [place], [place] según las indicaciones para mantener la [place] en alise posición neutra. [place] también reduce la presión sobre swift nervio mediano. · Pregúntele a swift médico si debería hacer sesiones de fisioterapia o de terapia ocupacional para aprender a hacer las tareas de [employer]. · Levan clases de yoga para estirar los músculos y fortalecer las tre y las Farrah. El yoga ha [employer] síntomas del túnel cindy. Cómo prevenir el síndrome del túnel cindy  · Cuando trabaje en la computadora, Lynn Ashby 31 y las muñecas alineadas con los antebrazos. Mantenga los codos cerca de seven costados. Levan un descanso con alise frecuencia de 10 a 15 minutos. · Trate de hacer los siguientes ejercicios:  ¨ Calentamiento: Gire la lamonte hacia arriba, Tuckerman Osler y de un lado a otro. Repita esto 4 veces. Estire [employer] dedos, relájelos y vuelva a estirarlos. Repita 4 veces. Estire el pulgar doblándolo levemente hacia atrás, manténgalo en demetrio posición y relájelo. Repita 4 veces. ¨ Estiramiento con los [place] en posición de orar: Comience colocando las margaret de las tre juntas lalo del pecho, maggi debajo del [place]. Baje las tre lentamente hacia la línea de la cintura y manténgalas cerca del estómago con las margaret juntas hasta que sienta un estiramiento leve a moderado debajo de los antebrazos. Mantenga la posición entre 10 y 21 segundos. Repita 4 veces.   ¨ Estiramiento del flexor de la lamonte: Michael Salguero frente a usted, con la sales Lapaz. Doble la lamonte y apunte con la mano hacia el piso. Con la [employer], doble con suavidad la lamonte aún más hasta que sienta un estiramiento entre leve y moderado en el antebrazo. Mantenga la posición entre 10 y 21 segundos. Repita 4 veces. ¨ Estiramiento del extensor de la lamonte: Repita los pasos para el estiramiento del flexor de la lamonte yuri comience con la sales extendida Sherrlyn Rias. · Apriete alise pelota de goma varias veces al día para mantener andreia las tre y los dedos. · Evite sostener objetos (jami un libro) en la misma posición glen mucho tiempo. Siempre que sea posible, utilice toda la mano para dallas un objeto. Si [place] solo el pulgar y el dedo índice puede tensionar la [place]. · No fume. Puede empeorar esta afección ya que reduce el flujo de curt hacia el nervio El paso. Si necesita ayuda para dejar de fumar, hable con swift médico sobre programas y medicamentos para dejar de fumar. Estos pueden aumentar seven probabilidades de dejar el hábito para siempre. ¿Cuándo debe pedir ayuda? Preste especial atención a los cambios de swift riki y asegúrese de comunicarse con swift médico si:  ? · El dolor u otros problemas no mejoran con los cuidados en el hogar. ? · Desea más información sobre la fisioterapia o la terapia ocupacional.   ? · Tiene efectos secundarios producidos por los corticosteroides, tales jami:  ¨ Aumento de [place]. ¨ Cambios en el estado de ánimo. ¨ Problemas para dormir. ¨ Fácil formación de moretones. ? · Tiene otros problemas con los medicamentos. ¿Dónde puede encontrar más información en inglés? Pricila Dyson a http://my-saúl.info/. Juan Nava R432 en la búsqueda para aprender más acerca de \"Síndrome del túnel carpiano: Instrucciones de cuidado - [ Carpal Tunnel Syndrome: Care Instructions ]. \"  Revisado: 21 marzo, 2017  Versión del contenido: 11.4  © 5383-2383 Healthwise, Incorporated.  [address] fueron adaptadas bajo licencia por [facility] (which disclaims liability or warranty for this information). Si usted tiene Piatt [place] afección médica o sobre estas instrucciones, siempre pregunte a swift profesional de riki. [facility] niega toda garantía o responsabilidad por swift uso de esta información.

## 2018-02-18 DIAGNOSIS — E11.65 TYPE 2 DIABETES MELLITUS WITH HYPERGLYCEMIA, WITH LONG-TERM CURRENT USE OF INSULIN (HCC): Primary | ICD-10-CM

## 2018-02-18 DIAGNOSIS — Z79.4 TYPE 2 DIABETES MELLITUS WITH HYPERGLYCEMIA, WITH LONG-TERM CURRENT USE OF INSULIN (HCC): Primary | ICD-10-CM

## 2018-02-19 NOTE — PROGRESS NOTES
Diagnoses and all orders for this visit:    1. Type 2 diabetes mellitus with hyperglycemia, with long-term current use of insulin (HCC)  -     HEMOGLOBIN A1C WITH EAG; Future  -     LIPID PANEL; Future      I have ordered labs as above to be drawn at her next appointment. Even if she is not fasting, will plan to draw lipid panel as she is due for this.

## 2018-02-27 ENCOUNTER — OFFICE VISIT (OUTPATIENT)
Dept: FAMILY MEDICINE CLINIC | Age: 63
End: 2018-02-27

## 2018-02-27 ENCOUNTER — DOCUMENTATION ONLY (OUTPATIENT)
Dept: FAMILY MEDICINE CLINIC | Age: 63
End: 2018-02-27

## 2018-02-27 VITALS
HEART RATE: 75 BPM | BODY MASS INDEX: 30.24 KG/M2 | SYSTOLIC BLOOD PRESSURE: 135 MMHG | TEMPERATURE: 98.2 F | WEIGHT: 153 LBS | DIASTOLIC BLOOD PRESSURE: 68 MMHG

## 2018-02-27 DIAGNOSIS — E11.65 TYPE 2 DIABETES MELLITUS WITH HYPERGLYCEMIA, WITH LONG-TERM CURRENT USE OF INSULIN (HCC): Primary | ICD-10-CM

## 2018-02-27 DIAGNOSIS — Z79.4 TYPE 2 DIABETES MELLITUS WITH HYPERGLYCEMIA, WITH LONG-TERM CURRENT USE OF INSULIN (HCC): Primary | ICD-10-CM

## 2018-02-27 DIAGNOSIS — E78.00 HYPERCHOLESTEROLEMIA: ICD-10-CM

## 2018-02-27 LAB — GLUCOSE POC: NORMAL MG/DL

## 2018-02-27 RX ORDER — METFORMIN HYDROCHLORIDE 500 MG/1
500 TABLET ORAL 2 TIMES DAILY WITH MEALS
Qty: 60 TAB | Refills: 3 | Status: SHIPPED | OUTPATIENT
Start: 2018-02-27 | End: 2018-09-25

## 2018-02-27 RX ORDER — LOVASTATIN 20 MG/1
40 TABLET ORAL
Qty: 60 TAB | Refills: 3 | Status: SHIPPED | OUTPATIENT
Start: 2018-02-27

## 2018-02-27 RX ORDER — GLIPIZIDE 5 MG/1
TABLET ORAL
Qty: 30 TAB | Refills: 3 | Status: SHIPPED | OUTPATIENT
Start: 2018-02-27 | End: 2018-09-25

## 2018-02-27 NOTE — MR AVS SNAPSHOT
[address] [place] [address] [address] [facility] 
[phone] Patient: Isabelle Valentino MRN: [id number] Visit Information Bright Lobo Personal Médico Departamento Teléfono del Dep. Número de visita 2/27/2018 12:30 PM Palmira Xavier [facility] [phone] Follow-up Instructions Return for April 17 or Maly [address] Aug. Upcoming Health Maintenance Date Due  
 EYE EXAM RETINAL OR DILATED Q1 5/27/1963 DTaP/Tdap/Td series (1 - Tdap) 5/27/1974 FOBT Q 1 YEAR AGE 50-75 5/27/2003 ZOSTER VACCINE AGE 60> 3/27/2013 LIPID PANEL Q1 2/18/2017 FOOT EXAM Q1 8/23/2017 HEMOGLOBIN A1C Q6M 6/5/2018 MICROALBUMIN Q1 12/5/2018 BREAST CANCER SCRN MAMMOGRAM 11/1/2019 PAP AKA CERVICAL CYTOLOGY 11/1/2020 Alergias  Review Complete El: 2/27/2018 Por: VESNA Vick del:  2/27/2018 No Known Allergies Vacunas actuales Revisadas el:  1/16/2018 Patty Borges Influenza Vaccine 3/1/2014, 1/8/2013, 11/5/2009 Influenza Vaccine (Quad) PF 1/16/2018, 11/19/2015, 11/4/2014 Pneumococcal Polysaccharide (PPSV-23) 6/30/2016 No revisadas esta visita You Were Diagnosed With   
  
 Cassi Ochoa Type 2 diabetes mellitus with hyperglycemia, with long-term current use of insulin (HCC)    -  Primary ICD-10-CM: E11.65, Z79.4 ICD-9-CM: 250.00, 790.29, V58.67 Hypercholesterolemia     ICD-10-CM: E78.00 ICD-9-CM: 272.0 Partes vitales PS Pulso Temperatura Peso (percentil de crecimiento) BMI (IMC) Estado obstétrico  
 135/68 (BP 1 Location: Left arm, BP Patient Position: Sitting) 75 98.2 °F (36.8 °C) (Oral) 153 lb (69.4 kg) 30.24 kg/m2 Postmenopausal  
 Estatus de tabaquísmo Never Smoker Historial de signos vitales BMI and BSA Data  Body Mass Index Body Surface Area  
 30.24 kg/m 2 1.71 m 2  
  
 [facility] Phone [facility] [address] [phone] Diallo lista de medicamentos actualizada Lista actualizada 18  1:07 PM.  Sherice Delatorre use diallo lista de medicamentos más reciente. acetaminophen 650 mg Tber También conocido jami:  TYLENOL ARTHRITIS  
2 bid prn. [employer] veces al chiki si necesita para dolor. cephALEXin 500 mg capsule También conocido jami:  [place] Geovanna Take 1 Cap by mouth three (3) times daily. Gave pt a hand written rx bc at [facility]  
  
 glipiZIDE 5 mg tablet También conocido jami:  Sirena Hair 1 po qday 30 min ac lunch; Shayy 1 por boca 30 min antes de almuerzo  
  
 insulin  unit/mL injection También conocido jami:  HumuLIN N NPH U-100 Insulin 25 Units by SubCUTAneous route nightly. Venezuelan sig  
  
 lovastatin 20 mg tablet También conocido jami:  MEVACOR Take 2 Tabs by mouth nightly. metFORMIN 500 mg tablet También conocido jami:  GLUCOPHAGE Take 1 Tab by mouth two (2) times daily (with meals). (yaron dos veces al chiki)  
  
 naproxen 500 mg tablet También conocido jami:  NAPROSYN Take 1 Tab by mouth two (2) times daily (with meals). [place] 1 [facility] [employer] veces al chiki con comida Recetas Enviado a la Nilda Refills  
 metFORMIN (GLUCOPHAGE) 500 mg tablet 3 Sig: Take 1 Tab by mouth two (2) times daily (with meals). [facility] dos veces al chiki) Class: Normal  
 [facility] Ph #: [phone] Route: Oral  
 glipiZIDE (GLUCOTROL) 5 mg tablet 3 Si po qday 30 min ac lunch; Shayy 1 por boca 30 min antes de almuerzo Class: Normal  
 Pharmacy: [address] [place] [address] Ph #: [phone]  
 lovastatin (MEVACOR) 20 mg tablet 3 Sig: Take 2 Tabs by mouth nightly.   
 Class: Normal  
 Pharmacy: [facility] [address] #: [phone] Route: Oral  
  
Hicimos lo siguiente AMB POC GLUCOSE BLOOD, BY GLUCOSE MONITORING DEVICE [19624 CPT(R)]  DIABETES FOOT EXAM [HM7 Custom] Instrucciones de seguimiento Return for April 17 or Maly [address] Aug.  
  
  
Introducing [facility] [place] [facility]! [facility] introduce portal paciente MyChart . Ahora se puede acceder a partes de swift expediente médico, enviar por correo electrónico la oficina de swift médico y solicitar renovaciones de medicamentos en línea. En swift navegador de Internet , Devon Velázquez a [email or web address] [employer]. Mu Sigma/mychart Jeremy clic en el usuario por Lanetta Filter? Gleancae Simmonds clic aquí en la sesión Soo Commander. Verá la página de registro [place]. Ingrese swift código de Bank [employer] blane y jami aparece a continuación. Usted no tendrá que [employer] código después de salvador completado el proceso de registro . Si usted no se inscribe antes de la fecha de caducidad , debe solicitar un nuevo código. · MyChart Código de acceso : [id number] Expires: 4/16/2018  9:38 AM 
 
Ingresa los últimos cuatro dígitos de swift Número de Seguro Social ( xxxx ) y fecha de nacimiento ( dd / mm / aaaa ) jami se indica y jeremy clic en Enviar. Usted será llevado a la siguiente página de registro . Crear un ID MyChart . Esta será swift ID de inicio de sesión de MyChart y no puede ser [place] , por lo que pensar en alise que es Gwynda Dykes y fácil de recordar . Crear alise contraseña MyChart . Usted puede cambiar swift contraseña en cualquier momento . Ingrese swift Password Reset de preguntas y Funes . [place] se puede utilizar en un momento posterior si usted olvida swift contraseña. Introduzca swift dirección de correo electrónico . Jennifer Weir recibirá alise notificación por correo electrónico cuando la nueva información está disponible en MyChart . Carolina ramsey en Registrarse.  [employer] wanda y descargar porciones de swift expediente Wander ramsey en el enlace de descarga del menú Resumen para descargar alise copia portátil de swift información médica . Si tiene [employer] , por favor visite la sección de preguntas frecuentes del sitio web MyChart . Recuerde, MyChart NO es que se utilizará para las necesidades urgentes. Para emergencias médicas , llame al 911 . Ahora disponible en swift iPhone y Android ! Por favor proporcione bert resumen de la documentación de cuidado a swift próximo proveedor. Your primary care clinician is listed as Bob Angeles. If you have any questions after today's visit, please call [phone].

## 2018-02-27 NOTE — PROGRESS NOTES
Coordination of Care  1. Have you been to the ER, urgent care clinic since your last visit? Hospitalized since your last visit? No    2. Have you seen or consulted any other health care providers outside of the [address] [place] [address] since your last visit? Include any pap smears or colon screening. No    Does the patient need refills?  YES    Learning Assessment Complete? yes  Results for orders placed or performed in visit on 02/27/18   AMB POC GLUCOSE BLOOD, BY GLUCOSE MONITORING DEVICE   Result Value Ref Range    Glucose  NF mg/dL

## 2018-02-27 NOTE — PROGRESS NOTES
Ms. Armond Mckeon,  1955, 57 y.o., # [id number]   Learning Assessment 3/19/2014   PRIMARY LEARNER Patient   HIGHEST LEVEL OF EDUCATION - PRIMARY LEARNER  DID NOT GRADUATE HIGH SCHOOL   PRIMARY LANGUAGE [place]ese   LEARNER PREFERENCE PRIMARY LISTENING   ANSWERED BY Patient     Health Maintenance Due   Topic    EYE EXAM RETINAL OR DILATED Q1     DTaP/Tdap/Td series (1 - Tdap)    FOBT Q 1 YEAR AGE 50-75     ZOSTER VACCINE AGE 60>     LIPID PANEL Q1     FOOT EXAM Q1     Social History: She reports that she has never smoked. She has never used smokeless tobacco. She reports that she does not drink alcohol or use illicit drugs. Family History: Her family history is not on file. Surgical History:  has a past surgical history that includes hx appendectomy and hx tubal ligation. Medications: has a current medication list which includes the following prescription(s): glipizide, insulin nph, metformin, cephalexin, lovastatin, naproxen, and acetaminophen. Wt Readings from Last 2 Encounters:   18 153 lb (69.4 kg)   18 150 lb (68 kg)      Hemoglobin A1c   Date Value Ref Range Status   2017 6.9 (H) 4.2 - 6.3 % Final   2017 8.1 (H) 4.2 - 6.3 % Final   2016 8.8 (H) 4.2 - 6.3 % Final   2016 8.7 (H) 4.2 - 6.3 % Final   2016 8.5 (H) 4.2 - 6.3 % Final   10/29/2015 9.9 (H) 4.2 - 6.3 % Final        Lab Results   Component Value Date/Time    Microalbumin/Creat ratio (mg/g creat)  2017 03:28 PM     Cannot calculate ratio due to microalbumin result outside reportable range.     Microalbumin,urine random <0.50 2017 03:28 PM    Creatinine 0.57 2016 08:54 AM      Lab Results   Component Value Date/Time    GFR est AA >60 2016 08:54 AM    GFR est non-AA >60 2016 08:54 AM       Lab Results   Component Value Date/Time    Cholesterol, total 182 2016 08:54 AM    HDL Cholesterol 56 2016 08:54 AM    LDL,Direct 120 (H) 2012 12:23 PM    LDL, calculated 115 (H) 02/18/2016 08:54 AM    Triglyceride 55 02/18/2016 08:54 AM    CHOL/HDL Ratio 3.3 02/18/2016 08:54 AM      Lab Results   Component Value Date/Time    ALT (SGPT) 22 02/18/2016 08:54 AM    AST (SGOT) 12 (L) 02/18/2016 08:54 AM    Alk.  phosphatase 76 02/18/2016 08:54 AM    Bilirubin, total 0.4 02/18/2016 08:54 AM

## 2018-02-27 NOTE — PROGRESS NOTES
Assessment/Plan:       ICD-10-CM ICD-9-CM    1. Type 2 diabetes mellitus with hyperglycemia, with long-term current use of insulin (HCC) E11.65 250.00 AMB POC GLUCOSE BLOOD, BY GLUCOSE MONITORING DEVICE    Z79.4 790.29      V58.67     Greater than 50% of this 25 minute visit was spent in face-to-face counseling/coordination of care regarding diabetes management. Follow-up Disposition: Not on 650 . [facility], [facility]  [address] [address]  Phone: [phone] Fax: [phone]      [facility]  Subjective:     Chief Complaint   Patient presents with    Diabetes     f/u    Hao Tavares is a 57 y.o. OTHER female who speaks Bulgarian.  used? Yes, Maida. For 15 days has not had metformin or glipizide but she has been taking the insulin. 1.5 months ago the lovastatin terminated. Diabetes   Brought in medications? no Last ate: today   Last took medications: taking insulin but not metformin or glipizide (they ran out)   Outpatient Medications Prior to Visit   Medication Sig Dispense Refill    glipiZIDE (GLUCOTROL) 5 mg tablet 1 po qday 30 min ac lunch; Shayy 1 por boca 30 min antes de almuerzo 30 Tab 3    insulin NPH (HUMULIN N) 100 unit/mL injection 25 Units by SubCUTAneous route nightly. Bulgarian sig 10 Vial 0    metFORMIN (GLUCOPHAGE) 500 mg tablet Take 1 Tab by mouth two (2) times daily (with meals). (yaron dos veces al chiki) 60 Tab 2    cephALEXin (KEFLEX) 500 mg capsule Take 1 Cap by mouth three (3) times daily. Gave pt a hand written rx bc at [facility] 30 Cap 0    lovastatin (MEVACOR) 20 mg tablet Take 2 Tabs by mouth nightly. 180 Tab 1    naproxen (NAPROSYN) 500 mg tablet Take 1 Tab by mouth two (2) times daily (with meals). [place] 1 [employer] veces al chiki con comida 60 Tab 1    acetaminophen (TYLENOL ARTHRITIS) 650 mg CR tablet 2 bid prn. [employer] veces al chiki si necesita para dolor.  1 Each 5     No facility-administered medications prior to visit. Taking medications as prescribed? No, she ran out. Physical Activity? no  Measuring glucoses? no   ROS:   no polyuria or polydipsia, no chest pain, dyspnea or TIA's, no numbness, tingling or pain in extremities. Social History: She reports that she has never smoked. She has never used smokeless tobacco. She reports that she does not drink alcohol or use illicit drugs. Family History: Her family history is not on file. Surgical History:   Past Surgical History:   Procedure Laterality Date    HX APPENDECTOMY      HX TUBAL LIGATION       Objective:     Vitals:    02/27/18 1215   BP: 135/68   Pulse: 75   Temp: 98.2 °F (36.8 °C)   TempSrc: Oral   Weight: 153 lb (69.4 kg)    No LMP recorded. Patient is postmenopausal.    Results for orders placed or performed in visit on 02/27/18   AMB POC GLUCOSE BLOOD, BY GLUCOSE MONITORING DEVICE   Result Value Ref Range    Glucose  NF mg/dL      Wt Readings from Last 2 Encounters:   02/27/18 153 lb (69.4 kg)   01/16/18 150 lb (68 kg)    Weight increased; Hemoglobin A1c   Date Value Ref Range Status   12/05/2017 6.9 (H) 4.2 - 6.3 % Final   04/27/2017 8.1 (H) 4.2 - 6.3 % Final    A1C decreased;   Lab Results   Component Value Date/Time    Microalbumin/Creat ratio (mg/g creat)  12/05/2017 03:28 PM     Cannot calculate ratio due to microalbumin result outside reportable range.     Microalbumin,urine random <0.50 12/05/2017 03:28 PM    Creatinine 0.57 02/18/2016 08:54 AM      Lab Results   Component Value Date/Time    GFR est AA >60 02/18/2016 08:54 AM    GFR est non-AA >60 02/18/2016 08:54 AM       Lab Results   Component Value Date/Time    Cholesterol, total 182 02/18/2016 08:54 AM    HDL Cholesterol 56 02/18/2016 08:54 AM    LDL,Direct 120 (H) 08/16/2012 12:23 PM    LDL, calculated 115 (H) 02/18/2016 08:54 AM    Triglyceride 55 02/18/2016 08:54 AM    CHOL/HDL Ratio 3.3 02/18/2016 08:54 AM      Lab Results   Component Value Date/Time    ALT (SGPT) 22 02/18/2016 08:54 AM    AST (SGOT) 12 (L) 02/18/2016 08:54 AM    Alk. phosphatase 76 02/18/2016 08:54 AM    Bilirubin, total 0.4 02/18/2016 08:54 AM   Neg carpal tunnel compression testing. Constitutional: She appears well-developed. Eyes: EOM are normal. Pupils are equal, round, and reactive to light. Neck: Neck supple. No thyromegaly present. Cardiovascular: Normal rate, regular rhythm, normal heart sounds and intact distal pulses. No murmur heard. Pulmonary/Chest: Effort normal and breath sounds normal.   Musculoskeletal: She exhibits no edema. No ulcers of the lower extremities. Assessment/Plan:   Diagnoses and all orders for this visit:    1. Type 2 diabetes mellitus with hyperglycemia, with long-term current use of insulin ([facility])  -     AMB POC GLUCOSE BLOOD, BY GLUCOSE MONITORING DEVICE    Has been eating chicken and veg which do not elevate her glucose. Drinks 6 bottles water/day each 12 oz. Glucose 99, 109; 130s before bed. Return 2-3 months DM. Diabetes Mellitus type 2, under uncertain control. Blood pressure under Good control. Greater than 50% of this 25 minute visit was spent in face-to-face counseling/coordination of care regarding diabetes management. Follow-up Disposition: Not on File   She is still  Having pain folding the dark napkins. Try carpal tunnel wrist splints. Sheron Mittal DNP, FNP-BC, BC-ADM  Lexis Tariq expressed understanding of this plan.

## 2018-05-17 ENCOUNTER — HOSPITAL ENCOUNTER (OUTPATIENT)
Dept: LAB | Age: 63
Discharge: HOME | End: 2018-05-17

## 2018-05-17 ENCOUNTER — LAB ONLY (OUTPATIENT)
Dept: FAMILY MEDICINE CLINIC | Age: 63
End: 2018-05-17

## 2018-05-17 DIAGNOSIS — Z79.4 TYPE 2 DIABETES MELLITUS WITH HYPERGLYCEMIA, WITH LONG-TERM CURRENT USE OF INSULIN (HCC): ICD-10-CM

## 2018-05-17 DIAGNOSIS — E11.65 TYPE 2 DIABETES MELLITUS WITH HYPERGLYCEMIA, WITH LONG-TERM CURRENT USE OF INSULIN (HCC): ICD-10-CM

## 2018-05-17 LAB
CHOLEST SERPL-MCNC: 173 MG/DL
EST. AVERAGE GLUCOSE BLD GHB EST-MCNC: 177 MG/DL
HBA1C MFR BLD: 7.8 % (ref 4.2–6.3)
HDLC SERPL-MCNC: 47 MG/DL
HDLC SERPL: 3.7 {RATIO} (ref 0–5)
LDLC SERPL CALC-MCNC: 93.2 MG/DL (ref 0–100)
LIPID PROFILE,FLP: ABNORMAL
TRIGL SERPL-MCNC: 164 MG/DL (ref ?–150)
VLDLC SERPL CALC-MCNC: 32.8 MG/DL

## 2018-05-17 PROCEDURE — 80061 LIPID PANEL: CPT | Performed by: NURSE PRACTITIONER

## 2018-05-17 PROCEDURE — 83036 HEMOGLOBIN GLYCOSYLATED A1C: CPT | Performed by: NURSE PRACTITIONER

## 2018-05-17 NOTE — PROGRESS NOTES
Patient presents for lab draw ordered by:FELIPE Ramirez,     The following labs were drawn Scott [place], LPN    Lipid Profile and HgA1C    The following tubes were sent:  Gold  ( 1) and Lavender  ( 1)

## 2018-05-22 NOTE — PROGRESS NOTES
Not taking insulin raised your A1c a lot, from 6.9 to 7.8. You are taking it now. No changes to the plan.

## 2018-05-24 NOTE — PROGRESS NOTES
Left a generic message for patient today asking the patient to call back to speak with a nurse for message from provider.  Molly Bonilla RN

## 2018-05-30 NOTE — PROGRESS NOTES
Telephoned patient to home/cell number, no answer. Left generic message to return call to [facility] office.  Jesus Domínguez RN

## 2018-05-31 NOTE — PROGRESS NOTES
Letter mailed to the patient today letting her know that her recent A1C was high and to continue taking her insulin.  Sarah Lozada RN

## 2018-06-09 ENCOUNTER — OFFICE VISIT (OUTPATIENT)
Dept: FAMILY MEDICINE CLINIC | Age: 63
End: 2018-06-09

## 2018-06-09 VITALS
SYSTOLIC BLOOD PRESSURE: 150 MMHG | DIASTOLIC BLOOD PRESSURE: 81 MMHG | HEIGHT: 60 IN | WEIGHT: 153.6 LBS | TEMPERATURE: 98.3 F | OXYGEN SATURATION: 98 % | BODY MASS INDEX: 30.15 KG/M2 | HEART RATE: 71 BPM

## 2018-06-09 DIAGNOSIS — J30.89 NON-SEASONAL ALLERGIC RHINITIS DUE TO OTHER ALLERGIC TRIGGER: ICD-10-CM

## 2018-06-09 DIAGNOSIS — Z13.9 ENCOUNTER FOR SCREENING: Primary | ICD-10-CM

## 2018-06-09 LAB — GLUCOSE POC: 224 MG/DL

## 2018-06-09 RX ORDER — LORATADINE 10 MG/1
10 TABLET ORAL DAILY
Qty: 90 TAB | Refills: 3 | Status: SHIPPED | OUTPATIENT
Start: 2018-06-09

## 2018-06-09 RX ORDER — TRIAMCINOLONE ACETONIDE 55 UG/1
SPRAY, METERED NASAL
Qty: 16.5 G | Refills: 11 | Status: SHIPPED | OUTPATIENT
Start: 2018-06-09

## 2018-06-09 NOTE — PROGRESS NOTES
At discharge station AVS was printed and reviewed with pt with Johann Mcghee as . Printed [facility] for pt. I sent a message to Navdeep Lazo with [facility] and copied Estella Marc as well. Pt says she is going to be out of insulin in 2 days. She says she LM for AYALA Finch one month ago requesting refill. Pt will call office on Monday afternoon to speak with Estella as well.  Geovany Meza RN

## 2018-06-09 NOTE — PATIENT INSTRUCTIONS
Alergias: Instrucciones de cuidado - [ Allergies: Care Instructions ]  Instrucciones de cuidado    Las alergias ocurren cuando el sistema de defensa del organismo (sistema inmunitario) reacciona de manera excesiva ante ciertas sustancias. El sistema inmunitario trata a alise sustancia inofensiva jami si fuera un microbio perjudicial o un virus. Existen muchas sustancias que pueden provocar esta reacción excesiva, incluidos el polen, los medicamentos, los alimentos, el polvo, la caspa de los [place] y el moho. Las [employer] pueden ser leves o graves. Las alergias leves pueden manejarse en el hogar. Sin embargo, es posible que necesite dallas medicamentos para prevenir problemas. Manejar las alergias es alise parte importante del estar saludable. Swift médico podría sugerirle que se jeremy alise prueba de [place] para ayudar a encontrar la causa de las [employer]. Duarte Blonder que sepa cuáles son las cosas que [place] síntomas, puede evitarlas. [place] puede prevenir los síntomas de [place] y [de-identified] de [facility]. Para las [employer] graves que provocan reacciones que afectan a todo swift organismo (reacciones anafilácticas), swift médico podría recetarle alise inyección de epinefrina para que la lleve con usted en francisco de Benedict Gasmen reacción grave. Aprenda a aplicarse la dosis usted mismo y llévela consigo todo el [place]a. Asegúrese de que no haya caducado. La atención de seguimiento es alise parte clave de swift tratamiento y seguridad. Asegúrese de hacer y acudir a todas las citas, y llame a swift médico si está teniendo problemas. También es alise buena idea saber los resultados de los exámenes y mantener alise lista de los medicamentos que karen. ¿Cómo puede cuidarse en el hogar? · Si swift médico le ha [employer] ácaros del polvo o el polvo es lo que causa swift alergia, reduzca la cantidad de polvo alrededor de swift cama:  ¨ Lave las sábanas, las fundas de las almohadas y demás ropa de cama con Ak Chin todas las semanas.   Odin Roberto fundas para almohadas, edredones y colchones a prueba de polvo. Evite las fundas de plástico, ya que tienden a romperse fácilmente y no \"respiran\". Lave la ropa de cama siguiendo las instrucciones de la etiqueta. ¨ No use mantas ni almohadas que no necesite. ¨ Use mantas que pueda yfn en la lavadora. [address] tony y las alfombras de swift habitación, ya que atraen y acumulan polvo. · Si usted es alérgico al polvo del hogar y a los [place], no use humidificadores. El médico puede sugerirle maneras de controlar el polvo y [place]. · Busque rastros de cucarachas. Las cucarachas provocan reacciones alérgicas. Use cebos para cucarachas para eliminarlas. Luego, limpie debby swift casa. A las cucarachas les [employer] lugares donde se almacenan bolsas del josue, periódicos, botellas vacías o ramy de cartón. No guarde estos objetos dentro de la casa, y mantenga el contenedor de basura y los recipientes de alimentos debby cerrados. Selle todos los lugares por donde las cucarachas puedan ingresar a swift hogar. · Si usted es alérgico al moho, deshágase de muebles, tapetes y tony que huelan a moho. Revise que no haya moho en el baño. · Si usted es alérgico al polen del exterior o a las esporas de moho, use el aire acondicionado. Cambie o limpie todos los filtros alise vez al [facility]. [facility]. · Si usted es alérgico al polen, no salga cuando la concentración de polen sea dave. Use alise aspiradora con filtro HEPA o filtro de doble espesor al [employer] a la [place]. · No salga cuando la contaminación del aire sea dave. Evite los vapores de la pintura, los perfumes y otros olores andreia. · Evite todo lo que pueda empeorar seven alergias. Manténgase alejado del humo. No fume ni deje que otras personas lo nydia en swift casa. No use chimeneas ni estufas de leña. · Si usted es alérgico a seven mascotas, cambie el filtro de aire de la calefacción todos los [place].  Use filtros de alto rendimiento. · Si usted es alérgico a la caspa de los [place], no deje que las mascotas entren a la casa o manténgalas fuera de swift habitación. Las alfombras [place] y los muebles tapizados con wilian pueden albergar gran cantidad de caspa de animales. Melvin vez tenga que reemplazarlos. ¿Cuándo debe pedir ayuda? Aplíquese alise inyección de epinefrina si:  ? · Piensa que está teniendo Jaquita Frames reacción alérgica grave. ? · Tiene síntomas en más de alise jacob del cuerpo, jami náuseas leves y comezón en la boca. ? Después de aplicarse alise inyección de epinefrina, llame al 911 incluso si se siente mejor. ?Llame al 911 si:  ? · Tiene síntomas de alise reacción alérgica grave. Estos pueden incluir:  ¨ Zonas abultadas y enrojecidas (ronchas) que aparecen repentinamente por todo el cuerpo. ¨ Hinchazón de la garganta, la boca, los labios o la [place]. ¨ Dificultad para respirar. ¨ Pérdida del conocimiento (desmayo). O podría sentirse muy aturdido o de repente sentirse débil, confuso o agitado. ? · Le goss aplicado alise inyección de epinefrina, incluso si se siente mejor. ?Llame a swift médico ahora mismo o busque atención médica inmediata si:  ? · Tiene síntomas de alise reacción alérgica, tales jami:  ¨ Salpullido o ronchas (zonas abultadas y enrojecidas en la piel). ¨ Comezón. ¨ Hinchazón. ¨ Dolor abdominal, náuseas o vómito. ?Preste especial atención a los cambios en swift riki y asegúrese de comunicarse con swift médico si:  ? · No mejora jami se esperaba. ¿Dónde puede encontrar más información en inglés? Elder Slimmer a http://my-saúl.info/. Judy Records N593 en la búsqueda para aprender más acerca de \"Alergias: Instrucciones de cuidado - [ Allergies: Care Instructions ]. \"  Revisado: 29 septiembre, 2016  Versión del contenido: 11.4  © 9253-7988 [employer], [employer]. Las instrucciones de cuidado fueron adaptadas bajo licencia por [employer] (which disclaims liability or warranty for this information). Si usted tiene Faulk Herron afección médica o sobre estas instrucciones, siempre pregunte a swift profesional de riki. [facility] niega toda garantía o responsabilidad por swift uso de esta información.

## 2018-06-09 NOTE — MR AVS SNAPSHOT
[address] [facility] 
 
 
 [address] [facility] [address] [place] 
[phone] Patient: Nallely Grey MRN: [id number] DPX:[id number] Visit Information Loyda Wade Personal Médico Departamento Teléfono del Dep. Número de visita 6/9/2018 10:15 AM Jory Betts NP Pauline 143 HEART [phone] Follow-up Instructions Return if symptoms worsen or fail to improve. Your Appointments 7/24/2018 12:30 PM  
Follow Up with Say Davison NP  
[facility] ([facility] Appt Note: F/U per LK  
 [address] [facility] [address] [place] [id number]  
[phone]  
  
   
 [address] [place] [address] [address] [place] [address] Upcoming Health Maintenance Date Due  
 EYE EXAM RETINAL OR DILATED Q1 5/27/1963 DTaP/Tdap/Td series (1 - Tdap) 5/27/1974 FOBT Q 1 YEAR AGE 50-75 5/27/2003 ZOSTER VACCINE AGE 60> 3/27/2013 GLAUCOMA SCREENING Q2Y 5/27/2018 Bone Densitometry (Dexa) Screening 5/27/2018 Pneumococcal 65+ Low/Medium Risk (1 of 2 - PCV13) 5/27/2018 Influenza Age 5 to Adult 8/1/2018 HEMOGLOBIN A1C Q6M 11/17/2018 MICROALBUMIN Q1 12/5/2018 FOOT EXAM Q1 2/27/2019 LIPID PANEL Q1 5/17/2019 BREAST CANCER SCRN MAMMOGRAM 11/1/2019 PAP AKA CERVICAL CYTOLOGY 11/1/2020 Alergias  Review Complete El: 6/9/2018 Por: Jory Betts NP  
 Lore Lunch del:  6/9/2018 No Known Allergies Vacunas actuales Revisadas el:  1/16/2018 Brooks Pennington Influenza Vaccine 3/1/2014, 1/8/2013, 11/5/2009 Influenza Vaccine (Quad) PF 1/16/2018, 11/19/2015, 11/4/2014 Pneumococcal Polysaccharide (PPSV-23) 6/30/2016 No revisadas esta visita You Were Diagnosed With   
  
 [facility] Encounter for screening    -  Primary ICD-10-CM: Z13.9 ICD-9-CM: V82.9  Non-seasonal allergic rhinitis due to other allergic trigger     ICD-10-CM: J30.89 
 ICD-9-CM: 477.8 Partes vitales PS Pulso Temperatura Lena ( percentil de crecimiento) Peso (percentil de crecimiento) SpO2  
 150/81 (BP 1 Location: Right arm, BP Patient Position: Sitting) 71 98.3 °F (36.8 °C) (Oral) 4' 11.84\" (1.52 m) 153 lb 9.6 oz (69.7 kg) 98% BMI [facility]) Estado obstétrico Estatus de tabaquísmo 30.16 kg/m2 Postmenopausal Never Smoker Historial de signos vitales BMI and BSA Data Body Mass Index Body Surface Area  
 30.16 kg/m 2 1.72 m 2 [facility] Phone [address] [place] [phone] Diallo lista de medicamentos actualizada Huy Tang actualizada 6/9/18 11:52 AM.  Robert Alvarado use diallo lista de medicamentos más reciente. acetaminophen 650 mg Tber También conocido jami:  TYLENOL ARTHRITIS  
2 bid prn. Dos [employer] veces al chiki si necesita para dolor. glipiZIDE 5 mg tablet También conocido jami:  Lupe Medina 1 po qday 30 min ac lunch; Shayy 1 por boca 30 min antes de almuerzo  
  
 insulin  unit/mL injection También conocido jami:  HumuLIN N NPH U-100 Insulin 25 Units by SubCUTAneous route nightly. English sig  
  
 loratadine 10 mg tablet También conocido jami:  Curlee Arms Take 1 Tab by mouth daily. [place] 1 tableta por boca diario.  
  
 lovastatin 20 mg tablet También conocido jami:  MEVACOR Take 2 Tabs by mouth nightly. metFORMIN 500 mg tablet También conocido jami:  GLUCOPHAGE Take 1 Tab by mouth two (2) times daily (with meals). (yaron dos veces al chiki)  
  
 naproxen 500 mg tablet También conocido jami:  NAPROSYN Take 1 Tab by mouth two (2) times daily (with meals). [place] 1 [facility] [employer] veces al chiki con comida  
  
 triamcinolone 55 mcg nasal inhaler También conocido jami:  NASACORT  
2 sprays in each nare QHS. 2 spray en cada fossa nasal cada noche. Recetas Enviado a la Nilda Refills triamcinolone (NASACORT) 55 mcg nasal inhaler 11 Si sprays in each nare QHS. 2 spray en cada fossa nasal cada noche. Class: Normal  
 Pharmacy: [address] [place] [address] Ph #: [phone]  
 loratadine (CLARITIN) 10 mg tablet 3 Sig: Take 1 Tab by mouth daily. [place] 1 tableta por boca diario. Class: Normal  
 Pharmacy: [address] Ph #: [phone] Route: Oral  
  
Hicimos lo siguiente AMB POC GLUCOSE BLOOD, BY GLUCOSE MONITORING DEVICE [92082 CPT(R)] Instrucciones de seguimiento Return if symptoms worsen or fail to improve. Instrucciones para el Paciente Alergias: Instrucciones de cuidado - [ Allergies: Care Instructions ] Instrucciones de cuidado Las alergias ocurren cuando el sistema de defensa del organismo (sistema inmunitario) reacciona de manera excesiva ante ciertas sustancias. El sistema inmunitario trata a alise sustancia inofensiva jami si fuera un microbio perjudicial o un virus. Existen muchas sustancias que pueden provocar esta reacción excesiva, incluidos el polen, los medicamentos, los alimentos, el polvo, la caspa de los [place] y el moho. Las [employer] pueden ser leves o graves. Las alergias leves pueden manejarse en el hogar. Sin embargo, es posible que necesite dallas medicamentos para prevenir problemas. Manejar las alergias es alise parte importante del estar saludable. Swift médico podría sugerirle que se jeremy alise prueba de [place] para ayudar a encontrar la causa de las [employer]. Pal [place] que sepa cuáles son las cosas que Everly-Allie síntomas, puede evitarlas. [place] puede prevenir los síntomas de [place] y [de-identified] de [place].  
Para las [employer] graves que provocan reacciones que afectan a todo swift organismo (reacciones anafilácticas), swift médico podría recetarle alise inyección de epinefrina para que la lleve con usted en francisco de Chandni Urban reacción grave. Aprenda a aplicarse la dosis usted mismo y llévela consigo todo el [place]. Asegúrese de que no haya caducado. La atención de seguimiento es alise parte clave de swift tratamiento y seguridad. Asegúrese de hacer y acudir a todas las citas, y llame a swift médico si está teniendo problemas. También es alise buena idea saber los resultados de los exámenes y mantener alise lista de los medicamentos que karen. Cómo puede cuidarse en el hogar? · Si swift médico le ha [employer] ácaros del polvo o el polvo es lo que causa swift alergia, reduzca la cantidad de polvo alrededor de swift cama: 
¨ Lave las sábanas, las fundas de las almohadas y demás ropa de cama con [place] todas las semanas. ¨ Utilice fundas para almohadas, edredones y colchones a prueba de polvo. Evite las fundas de plástico, ya que tienden a romperse fácilmente y no \"respiran\". Lave la ropa de cama siguiendo las instrucciones de la etiqueta. ¨ No use mantas ni almohadas que no necesite. ¨ Use mantas que pueda yfn en la lavadora. [address] jimenez y las alfombras de swift habitación, ya que atraen y acumulan polvo. · Si usted es alérgico al polvo del hogar y a los [place], no use humidificadores. El médico puede sugerirle maneras de controlar el polvo y [place]. · Busque rastros de cucarachas. Las cucarachas provocan reacciones alérgicas. Use cebos para cucarachas para eliminarlas. Luego, limpie debby swift casa. A las cucarachas les Boeing lugares donde se almacenan bolsas del josue, periódicos, botellas vacías o ramy de cartón. No guarde estos objetos dentro de la casa, y mantenga el contenedor de basura y los recipientes de alimentos debby cerrados. Selle todos los lugares por donde las cucarachas puedan ingresar a swift hogar. · Si usted es alérgico al moho, deshágase de muebles, tapetes y tony que huelan a moho. Revise que no haya moho en el baño.  
· Si usted es alérgico al polen del exterior o a las esporas de moho, use el aire acondicionado. Cambie o limpie todos los filtros alise vez al mes. [facility]. · Si usted es alérgico al polen, no salga cuando la concentración de polen sea dave. Use alise aspiradora con filtro HEPA o filtro de doble espesor al [employer] a la [place]. · No salga cuando la contaminación del aire sea dave. Evite los vapores de la pintura, los perfumes y otros olores andreia. · Evite todo lo que pueda empeorar seven alergias. Manténgase alejado del humo. No fume ni deje que otras personas lo nydia en swift casa. No use chimeneas ni estufas de leña. · Si usted es alérgico a seven mascotas, cambie el filtro de aire de la calefacción todos los [place]. Use filtros de alto rendimiento. · Si usted es alérgico a la caspa de los [place], no deje que las mascotas entren a la casa o manténgalas fuera de swift habitación. Las alfombras [place] y los muebles tapizados con wilian pueden albergar gran cantidad de caspa de animales. Melvin vez tenga que reemplazarlos. Cuándo debe pedir ayuda? Aplíquese alise inyección de epinefrina si: 
? · Piensa que está teniendo [facility] reacción alérgica grave. ? · Tiene síntomas en más de alsie jacob del cuerpo, jami náuseas leves y comezón en la boca. ? Después de aplicarse alise inyección de epinefrina, llame al 911 incluso si se siente mejor. ?Llame al 911 si: 
? · Tiene síntomas de alise reacción alérgica grave. Estos pueden incluir: 
¨ Zonas abultadas y enrojecidas (ronchas) que aparecen repentinamente por todo el cuerpo. ¨ Hinchazón de la garganta, la boca, los labios o la [place]. ¨ Dificultad para respirar. ¨ Pérdida del conocimiento (desmayo). O podría sentirse muy aturdido o de repente sentirse débil, confuso o agitado. ? · Le goss aplicado alise inyección de epinefrina, incluso si se siente mejor. ?Llame a swift médico ahora mismo o busque atención médica inmediata si: 
? · Tiene síntomas de alise reacción alérgica, tales jami: ¨ Salpullido o ronchas (zonas abultadas y enrojecidas en la piel). ¨ Comezón. ¨ Hinchazón. ¨ Dolor abdominal, náuseas o vómito. ?Preste especial atención a los cambios en swift riki y asegúrese de comunicarse con swift médico si: 
? · No mejora jami se esperaba. Dónde puede encontrar más información en inglés? Ghislaine Alonso a [email or web address]-saúl.[email or web address] Sonia Paul X112 en la búsqueda para aprender más acerca de \"Alergias: Instrucciones de cuidado - [ Allergies: Care Instructions ]. \" 
Revisado: 29 septiembre, 2016 Versión del contenido: 11.4 © 7943-1395 Healthwise, Incorporated. Las instrucciones de cuidado fueron adaptadas bajo licencia por [employer]ctions (which disclaims liability or warranty for this information). Si usted tiene [place] afección médica o sobre estas instrucciones, siempre pregunte a swift profesional de riki. Healthwise, Incorporated niega toda garantía o responsabilidad por swift uso de esta información. Introducing [facility]! [facility] introduce portal paciente MyChart . Ahora se puede acceder a partes de swift expediente médico, enviar por correo electrónico la oficina de swift médico y solicitar renovaciones de medicamentos en línea. En swift navegador de Internet , [place] a [email or web address] Jeremy clic en el usuario por Mara Pierre? Criss Peeling clic aquí en la sesión [place]. Verá la página de registro [place]. Ingrese swift código de Bank  Kalani blane y jami aparece a continuación. Usted no tendrá que [employer] código después de salvador completado el proceso de registro . Si usted no se inscribe antes de la fecha de caducidad , debe solicitar un nuevo código. · MyChart Código de acceso : [id number] Expires: 9/7/2018 11:51 AM 
 
Ingresa los últimos cuatro dígitos de swift Número de Seguro Social ( xxxx ) y fecha de nacimiento ( dd / mm / aaaa ) jami se indica y jeremy clic en Enviar. Usted será llevado a la siguiente página de registro . Crear un ID MyChart . Esta será swift ID de inicio de sesión de MyChart y no puede ser [place] , por lo que pensar en alise que es Erby Balm y fácil de recordar . Crear alise contraseña MyChart . Usted puede cambiar swift contraseña en cualquier momento . Ingrese swift Password Reset de preguntas y Funes . [place] se puede utilizar en un momento posterior si usted olvida swift contraseña. Introduzca swift dirección de correo electrónico . Kina Chavez recibirá alise notificación por correo electrónico cuando la nueva información está disponible en MyChart . Estil Sunil clic en Registrarse. Lattie Melter wanda y descargar porciones de swift expediente médico. 
Francis clic en el enlace de descarga del menú Resumen para descargar alise copia portátil de swift información médica . Si tiene REYNALDOMoviridiana [employer] , por favor visite la sección de preguntas frecuentes del sitio web MyChart . Recuerde, MyChart NO es que se utilizará para las necesidades urgentes. Para emergencias médicas , llame al 911 . Ahora disponible en swift iPhone y Android ! Por favor proporcione bert resumen de la documentación de cuidado a swift próximo proveedor. Your primary care clinician is listed as Yuly Court. If you have any questions after today's visit, please call [phone].

## 2018-06-09 NOTE — PROGRESS NOTES
Coordination of Care  1. Have you been to the ER, urgent care clinic since your last visit? Hospitalized since your last visit? No    2. Have you seen or consulted any other health care providers outside of the [address] [place] [address] since your last visit? Include any pap smears or colon screening. No    Does the patient need refills? NO    Learning Assessment Complete?  yes  Depression Screening complete in the past 12 months? yes  Results for orders placed or performed in visit on 06/09/18   AMB POC GLUCOSE BLOOD, BY GLUCOSE MONITORING DEVICE   Result Value Ref Range    Glucose  mg/dL

## 2018-06-09 NOTE — PROGRESS NOTES
Subjective:     Chief Complaint   Patient presents with    Nasal Congestion     pt. states shes allergies towards any vapor sprays        She  is a 70 y.o. female who presents for evaluation of allergies. Pt reports prior Hx of allergy testing and was noted to be sensitive to  roaches and to pesticides. 1 week ago, Pt also noted she was at home and noted her  was spraying for roaches and began to   Have cough and a nose bleeds. No other nose bleeds. Has been taking OTC allergy pills (non-sedating) and feels like her S&S are improving. Occasional dry cough and throat irritation. No other attempted remedies aside from wearing a mask while at work. Glucoses have been around 100 and Pt has been taking insulin. No low glucoses. Is also requesting work note re: to work < 8 hours for her DM. Pt is already enrolled in FMLA per Lynn Rooney NP.                 ROS  Gen - no fever/chills  Resp - no dyspnea or cough  CV - no chest pain or JALLOH  Rest per HPI    Past Medical History:   Diagnosis Date    Candidiasis of other urogenital sites 1/11/2013    Depression     past history    Joint pain 4/23/2014    Hip, left hand, ankles    Medial epicondylitis of elbow 1/11/2013    Other and unspecified hyperlipidemia 1/11/2013    Type II or unspecified type diabetes mellitus without mention of complication, not stated as uncontrolled 1/11/2013     Past Surgical History:   Procedure Laterality Date    HX APPENDECTOMY      HX TUBAL LIGATION       Current Outpatient Prescriptions on File Prior to Visit   Medication Sig Dispense Refill    metFORMIN (GLUCOPHAGE) 500 mg tablet Take 1 Tab by mouth two (2) times daily (with meals). (yaron dos veces al chiki) 60 Tab 3    glipiZIDE (GLUCOTROL) 5 mg tablet 1 po qday 30 min ac lunch; Shayy 1 por boca 30 min antes de almuerzo 30 Tab 3    lovastatin (MEVACOR) 20 mg tablet Take 2 Tabs by mouth nightly.  60 Tab 3    insulin NPH (HUMULIN N) 100 unit/mL injection 25 Units by SubCUTAneous route nightly. [place] sig 10 Vial 0    cephALEXin (KEFLEX) 500 mg capsule Take 1 Cap by mouth three (3) times daily. Gave pt a hand written rx bc at [facility] 30 Cap 0    naproxen (NAPROSYN) 500 mg tablet Take 1 Tab by mouth two (2) times daily (with meals). Comunas 1 [employer] veces al chiki con comida 60 Tab 1    acetaminophen (TYLENOL ARTHRITIS) 650 mg CR tablet 2 bid prn. Dos [employer] veces al chiki si necesita para dolor. 1 Each 5     No current facility-administered medications on file prior to visit. Objective:     Vitals:    06/09/18 1047   BP: 150/81   Pulse: 71   Temp: 98.3 °F (36.8 °C)   TempSrc: Oral   SpO2: 98%   Weight: 153 lb 9.6 oz (69.7 kg)   Height: 4' 11.84\" (1.52 m)       Physical Examination:  General appearance - alert, well appearing, and in no distress  Eyes -sclera anicteric  Neck - supple, no significant adenopathy, no thyromegaly  Chest - clear to auscultation, no wheezes, rales or rhonchi, symmetric air entry  Heart - normal rate, regular rhythm, normal S1, S2, no murmurs, rubs, clicks or gallops  Neurological - alert, oriented, no focal findings or movement disorder noted    Mod inflammation in bilat turbinates     Recent Results (from the past 12 hour(s))   AMB POC GLUCOSE BLOOD, BY GLUCOSE MONITORING DEVICE    Collection Time: 06/09/18 10:59 AM   Result Value Ref Range    Glucose  mg/dL         Assessment/ Plan:   Diagnoses and all orders for this visit:    1. Encounter for screening  -     AMB POC GLUCOSE BLOOD, BY GLUCOSE MONITORING DEVICE    2. Non-seasonal allergic rhinitis due to other allergic trigger  -     triamcinolone (NASACORT) 55 mcg nasal inhaler; 2 sprays in each nare QHS. 2 spray en cada fossa nasal cada noche.  -     loratadine (CLARITIN) 10 mg tablet; Take 1 Tab by mouth daily. Comunas 1 tableta por boca diario. Given improvemement w/ claritin, suspect Pt may also benefit from steroidal nasal spray.      Start Nasacort QHS and cont claritin. Also encouraged pt to cont to wear mask as able and to use OTC saline nasal spray q2-3 hours while at work/PRN. Defer work adjustment per [place] discretion. Has f/u next month for DM management. I have discussed the diagnosis with the patient and the intended plan as seen in the above orders. The patient has received an after-visit summary and questions were answered concerning future plans. I have discussed medication side effects and warnings with the patient as well. The patient verbalizes understanding and agreement with the plan.     Follow-up Disposition: Not on File

## 2018-06-11 ENCOUNTER — TELEPHONE (OUTPATIENT)
Dept: FAMILY MEDICINE CLINIC | Age: 63
End: 2018-06-11

## 2018-06-11 ENCOUNTER — DOCUMENTATION ONLY (OUTPATIENT)
Dept: FAMILY MEDICINE CLINIC | Age: 63
End: 2018-06-11

## 2018-06-11 NOTE — TELEPHONE ENCOUNTER
Email received from Ashley Baltazar stating that the patient's PAP application has . In order to renew, she needs a copy of the patient's 2017 Federal Tax return. The patient has supplied tax returns in the past to support her PAP application. Telephone call made to the patient with assistance from [employer]  # [phone], to review this with the patient. There was no answer at the home/cell number so a generic message was left to please call the CAV office about her insulin.  Routing to callback and also to the provider re: patient reportedly running low on insulin; approving any stock insulin for the patient or should she be instructed to purchase at [facility] until renewed with PAP? (If approving stock, need an ordered entered into [facility] please specifying dose and number of vials.) Raoul Alegria RN

## 2018-06-11 NOTE — TELEPHONE ENCOUNTER
Ok to dispense one, 10ml vial of Novolin N to Pt from stock (at current dose should last Pt approx 40 days) while PAP renewal is pending. Added to Rx profile.

## 2018-06-13 NOTE — TELEPHONE ENCOUNTER
Telephone call made to the patient with assistance from [employer]  # [phone] to: 1) review the message from Bolivar Shadia about her PAP status   2) schedule delivery of a vial of stock insulin from [facility] and approved by Marbella Otoole   There was no answer at the home/cell phone number, so a message was left to please call the CAV office about her insulin. We then called Carolin Stefanie, [phone], as listed on the PHI. There was no answer, so a message was left asking Magdalena Zapata to please call the CAV office as soon as possible. Letter to the patient asking her to please call the CAV office about her insulin will be put in the mail to her today.  Marifer Angel RN

## 2018-06-15 ENCOUNTER — TELEPHONE (OUTPATIENT)
Dept: FAMILY MEDICINE CLINIC | Age: 63
End: 2018-06-15

## 2018-06-15 NOTE — TELEPHONE ENCOUNTER
Faroese-speaking patient (Int. [id number]) returned our call. She asked that we return the call at noon when she will have a break from work.     Margarett Harada April

## 2018-06-15 NOTE — TELEPHONE ENCOUNTER
Patient Calls            Creed Schlatter April routed conversation to Liza Kumar; Jeannette Neff, RN 49 minutes ago (9:28 AM)                     Angel Husain [phone]  Kiah Scherer April 50 minutes ago (9:26 AM)                   Incoming call:  to Aliyah Jarvis Gold [phone]                        Creed Schlatter April 50 minutes ago (9:26 AM)                 Botswanan-speaking patient (Int. [id number]) returned our call.   She asked that we return the call at noon when she will have a break from work.  [id number] [place] April                     Documentation

## 2018-06-15 NOTE — TELEPHONE ENCOUNTER
Pt called back with [employer] # [id number].  She will come for nurse visit on Tuesday at 12:30 to  stock vial of insulin and to drop off tax return for PAP renewal. Garfield Nobles RN

## 2018-06-18 NOTE — TELEPHONE ENCOUNTER
Per Flory Burr, the patient returned our call on 06-15-18 and will come to [facility] on 06-19-18 to  a vial of stock insulin (NPH) and also drop off financial paperwork needed by Juanjo Valladares, Doc [employer], to renew her PAP application. The vial of stock insulin was approved by Randell Godinez and is packaged for delivery to [facility] on 06-19-18.  Jyothi Urbina RN

## 2018-06-19 ENCOUNTER — CLINICAL SUPPORT (OUTPATIENT)
Dept: FAMILY MEDICINE CLINIC | Age: 63
End: 2018-06-19

## 2018-06-19 DIAGNOSIS — Z79.899 MEDICATION MANAGEMENT: Primary | ICD-10-CM

## 2018-06-19 NOTE — PROGRESS NOTES
Pt arrived for [place] for stock insulin . The pt was given the packaged 1 vial of NPH insulin. The insulin instructions were reviewed with the pt. Insulin NPH lot #[id number], EXP DATE-02/20, NDC # [id number]. Recorded I pick log book in refrigerator in [place]. The pt brought in 2017 tax returns. The tax papers were copied  And sent to the [facility] office.  Trena Herrera RN

## 2018-06-29 ENCOUNTER — TELEPHONE (OUTPATIENT)
Dept: FAMILY MEDICINE CLINIC | Age: 63
End: 2018-06-29

## 2018-06-29 NOTE — TELEPHONE ENCOUNTER
The 2017 tax return that the patient brought to the [facility] on 06-19-18 is not signed and therefore can not be submitted to Jun Rai to renew the patient's PAP application. Telephone call made to the patient today with assistance from [employer]  # [phone]. We reviewed the need for her to bring in a copy  of her signed and dated 2017 Federal 1040 tax return as soon as possible so that it can be sent in to Jun Rai. The patient expressed understanding and stated she will come to [address] [place] [address] on 07-03-18.  She understands that it will be a NV and to arrive between 9am and 3pm. Bee Urias RN

## 2018-09-04 ENCOUNTER — DOCUMENTATION ONLY (OUTPATIENT)
Dept: FAMILY MEDICINE CLINIC | Age: 63
End: 2018-09-04

## 2018-09-04 NOTE — PROGRESS NOTES
The patient's PAP application renewal is still incomplete because the tax return she submitted on June 19 is not signed. I reviewed this with patient on 06-29-18 by phone call. Since then, there has been no response from the patient. Per chart review, she has a provider appointment on 09-25-18. Letter sent to her today asking her to please call the CAV office to discuss her insulin. We last gave her a vial of stock NPH on 06-19-18.  Shivam Clark RN

## 2018-09-05 NOTE — PROGRESS NOTES
Tc made with the assistance of [employer] # [id number]. No one answered the phone. A message was left for the pt to return the call to the [facility] clinic.  Patricio Walter RN

## 2018-09-24 ENCOUNTER — DOCUMENTATION ONLY (OUTPATIENT)
Dept: FAMILY MEDICINE CLINIC | Age: 63
End: 2018-09-24

## 2018-09-24 NOTE — PROGRESS NOTES
Ms. Clark Baljit,  1955, 70 y.o., # [id number]   She did not complete her PAP paperwork in  and has not responded. We gave her a sample vial in . Plan to recheck A1c today. Her A1c harika a lot when she previously stopped insulin. Learning Assessment 3/19/2014   PRIMARY LEARNER Patient   HIGHEST LEVEL OF EDUCATION - PRIMARY LEARNER  DID NOT GRADUATE HIGH SCHOOL   PRIMARY LANGUAGE Armenian   LEARNER PREFERENCE PRIMARY LISTENING   ANSWERED BY Patient     Health Maintenance Due   Topic    EYE EXAM RETINAL OR DILATED Q1     DTaP/Tdap/Td series (1 - Tdap)    Shingrix Vaccine Age 50> (1 of 2)    FOBT Q 1 YEAR AGE 54-65     GLAUCOMA SCREENING Q2Y     Bone Densitometry (Dexa) Screening     Pneumococcal 65+ Low/Medium Risk (1 of 2 - PCV13)    Influenza Age 5 to Adult     Social History: She reports that she has never smoked. She has never used smokeless tobacco. She reports that she does not drink alcohol or use illicit drugs. Family History: Her family history is not on file. Surgical History:  has a past surgical history that includes hx appendectomy and hx tubal ligation. Medications: has a current medication list which includes the following prescription(s): insulin nph, triamcinolone, loratadine, metformin, glipizide, lovastatin, insulin nph, naproxen, and acetaminophen. Wt Readings from Last 2 Encounters:   18 153 lb 9.6 oz (69.7 kg)   18 153 lb (69.4 kg)      Hemoglobin A1c   Date Value Ref Range Status   2018 7.8 (H) 4.2 - 6.3 % Final   2017 6.9 (H) 4.2 - 6.3 % Final   2017 8.1 (H) 4.2 - 6.3 % Final   2016 8.8 (H) 4.2 - 6.3 % Final   2016 8.7 (H) 4.2 - 6.3 % Final   2016 8.5 (H) 4.2 - 6.3 % Final        Lab Results   Component Value Date/Time    Microalbumin/Creat ratio (mg/g creat)  2017 03:28 PM     Cannot calculate ratio due to microalbumin result outside reportable range.     Microalbumin,urine random <0.50 2017 03:28 PM Creatinine 0.57 02/18/2016 08:54 AM      Lab Results   Component Value Date/Time    GFR est AA >60 02/18/2016 08:54 AM    GFR est non-AA >60 02/18/2016 08:54 AM       Lab Results   Component Value Date/Time    Cholesterol, total 173 05/17/2018 08:38 AM    HDL Cholesterol 47 05/17/2018 08:38 AM    LDL,Direct 120 (H) 08/16/2012 12:23 PM    LDL, calculated 93.2 05/17/2018 08:38 AM    Triglyceride 164 (H) 05/17/2018 08:38 AM    CHOL/HDL Ratio 3.7 05/17/2018 08:38 AM      Lab Results   Component Value Date/Time    ALT (SGPT) 22 02/18/2016 08:54 AM    AST (SGOT) 12 (L) 02/18/2016 08:54 AM    Alk.  phosphatase 76 02/18/2016 08:54 AM    Bilirubin, total 0.4 02/18/2016 08:54 AM

## 2018-09-25 ENCOUNTER — HOSPITAL ENCOUNTER (OUTPATIENT)
Dept: LAB | Age: 63
Discharge: HOME | End: 2018-09-25

## 2018-09-25 ENCOUNTER — OFFICE VISIT (OUTPATIENT)
Dept: FAMILY MEDICINE CLINIC | Age: 63
End: 2018-09-25

## 2018-09-25 VITALS
SYSTOLIC BLOOD PRESSURE: 129 MMHG | WEIGHT: 149 LBS | TEMPERATURE: 98.4 F | HEART RATE: 65 BPM | BODY MASS INDEX: 29.25 KG/M2 | DIASTOLIC BLOOD PRESSURE: 59 MMHG

## 2018-09-25 DIAGNOSIS — Z79.4 TYPE 2 DIABETES MELLITUS WITH HYPERGLYCEMIA, WITH LONG-TERM CURRENT USE OF INSULIN (HCC): Primary | ICD-10-CM

## 2018-09-25 DIAGNOSIS — E11.65 TYPE 2 DIABETES MELLITUS WITH HYPERGLYCEMIA, WITH LONG-TERM CURRENT USE OF INSULIN (HCC): Primary | ICD-10-CM

## 2018-09-25 LAB — GLUCOSE POC: NORMAL MG/DL

## 2018-09-25 PROCEDURE — 83036 HEMOGLOBIN GLYCOSYLATED A1C: CPT | Performed by: NURSE PRACTITIONER

## 2018-09-25 RX ORDER — METFORMIN HYDROCHLORIDE 500 MG/1
1000 TABLET, EXTENDED RELEASE ORAL
Qty: 180 TAB | Refills: 1 | Status: SHIPPED | OUTPATIENT
Start: 2018-09-25

## 2018-09-25 NOTE — PATIENT INSTRUCTIONS
Estiramientos: Ejercicios - [ Stretching: Exercises ]  Instrucciones de cuidado  Aquí se presentan algunos ejemplos de ejercicios de estiramiento. Empiece cada ejercicio lentamente. Reduzca la intensidad del ejercicio si Aaron Garber a tener dolor. Diallo médico o fisioterapeuta le dirán cuándo puede comenzar con estos ejercicios y cuáles funcionarán mejor para usted. Cómo hacer los ejercicios  Estiramiento del gran dorsal    1. Párese con la espalda recta y los pies separados a la altura de los hombros. Bertha estiramiento puede hacerlo sentado, si no se mantiene firme [employer]. 2. Sostenga los brazos arriba de la [place] y sujete alise mano con la otra. 3. Tire hacia arriba mientras se inclina hacia el lado derecho manteniéndose derecho. Mantenga recta la parte inferior del cuerpo. Debe sentir el estiramiento a lo nabil del costado melissa. 4. Mantenga la posición de 15 a 30 segundos y [facility] de lado. 5. Repita de 2 a 4 veces a cada lado. Estiramiento del tríceps    1. Párese con la espalda recta y los pies separados a la altura de los hombros. Bertha estiramiento puede hacerlo sentado, si no se mantiene firme [employer]. 2. [place] verticalmente el codo melissa con el brazo doblado. 3. [place] el codo melissa con la mano derecha y jálelo hacia la buck con alise presión ligera. Si usted es más flexible, podría jalar el brazo ligeramente por detrás de la buck. Sentirá el estiramiento a lo nabil de la parte trasera del [place]. 4. Mantenga la posición de 15 a 30 segundos y [facility] de codo. 5. Repita de 2 a 4 veces con cada brazo. Estiramiento de la pantorrilla    1. Coloque las tre en la pared para conservar el equilibrio. También puede hacer esto con las tre en el respaldo de alise silla, alise superficie o un árbol. 2. Dé un paso atrás con la pierna izqumaikolda. Manténgala recta y presione el talón melissa contra el suelo.   3. Presione con las caderas hacia adelante, doblando ligeramente la pierna derecha. Sentirá el estiramiento en la pantorrilla izquierda. 4. Sostenga el estiramiento de 15 a 30 segundos. 5. Repita de 2 a 4 veces con cada pierna. Estiramiento del cuádriceps    1. Acuéstese de costado, con la buck apoyada en [facility]. 2. Doble la pierna de [facility] atrás y tómese del tobillo con la otra mano. 3. Estire la pierna hacia atrás jalando el pie hacia las nalgas (glúteos). Sentirá el estiramiento en la parte frontal del muslo. Si esto le causa tensión en las rodillas, no jeremy bert estiramiento. 4. Sostenga el estiramiento de 15 a 30 segundos. 5. Repita de 2 a 4 veces con cada pierna. Estiramiento de la dorothy    1. Siéntese en el suelo y junte las plantas de los pies. No encorve la espalda. 2. Tómese de los tobillos y [address] las piernas hacia usted. 3. [place] contra el suelo. Sentirá el estiramiento en la parte interior de los muslos. 4. Mantenga la posición de 15 a 30 segundos. 5. Repita de 2 a 4 veces. Estiramiento de la corva en el pb de alise lizette    1. Acuéstese en el suelo cerca del pb de [facility] con las nalgas cerca de la pared. 2. Deje que la pierna que no está estirando pase a través del pb de carlos Gaytan. 3. Ponga la pierna que desea estirar en la pared, y extienda la rodilla hasta que sienta un leve estiramiento en la parte posterior de la pierna. 4. Sostenga el estiramiento por lo menos de 15 a 30 segundos. Repita de 2 a 4 veces. La atención de seguimiento es alise parte clave de swift tratamiento y seguridad. Asegúrese de hacer y acudir a todas las citas, y llame a swift médico si está teniendo problemas. También es alise buena idea saber los resultados de seven exámenes y mantener alise lista de los medicamentos que karen. ¿Dónde puede encontrar más información en inglés? Francesca Scheuermann a http://my-saúl.info/.   Lizzie Najear [id number] en la búsqueda para aprender más acerca de \"Estiramientos: Ejercicios - [ Stretching: Exercises ].\"  Revisado: 7 aron, 2017  Versión del contenido: 11.7  © 3652-9510 Healthwise, Incorporated. Las instrucciones de cuidado fueron adaptadas bajo licencia por [employer] (which disclaims liability or warranty for this information). Si usted tiene [place] afección médica o sobre estas instrucciones, siempre pregunte a swift profesional de riki. Healthwise, Incorporated niega toda garantía o responsabilidad por swift uso de esta información. Acondicionamiento muscular: Ejercicios - [ Muscle Conditioning: Exercises ]  Instrucciones de cuidado  Aquí se presentan algunos ejemplos de ejercicios para acondicionamiento muscular. Empiece cada ejercicio lentamente. Reduzca la intensidad del ejercicio si [place] a tener dolor. Swift médico o fisioterapeuta le dirán cuándo puede comenzar con estos ejercicios y cuáles funcionarán mejor para usted. Cómo hacer los ejercicios  Lagartijas de pared    Cuando pueda hacer el ejercicio en la pared cómodamente (sin que sienta cansados los músculos), puede probarlo contra la cubierta de un mesón. Empiece con 5 repeticiones también y [place] a 8 y luego a 15. Después puede avanzar lentamente a hacerlas en el extremo de un sofá o en alise silla sólida y finalmente en el suelo. 6. Párese frente a alise pared, a entre 12 y 18 pulgadas (30 - 45 cm) de distancia. [address] tre en la pared, a la altura de los hombros.  8. Doble los brazos lentamente y acerque la sohan a la pared, [employer] caderas y los hombros hacia adelante. 9. Empuje lentamente para regresar a la posición inicial.  10. Empiece con 5 repeticiones y [place] a 8 y luego a 15.  6. Descanse un minuto y repita el ejercicio. Extensión de la rodilla    Si bert ejercicio le resulta fácil, puede colocarse un peso ligero alrededor del tobillo o atar alise breen elástica de resistencia a la pata de alise silla y a un tobillo.   6. Sentado en alise silla, estire alise pierna y sosténgala mientras cuenta lentamente hasta 5. Asegúrese de no poner rígida la rodilla. 7. Repita de 8 a 12 veces. 8. Descanse un minuto y repita el ejercicio. 9. Jeremy el mismo ejercicio con la otra pierna. Levantamientos laterales de la pierna (acostado)    Si bert ejercicio le resulta fácil, puede colocarse un peso ligero alrededor del tobillo o atar alise breen elástica de resistencia a ambos tobillos. 6. Acuéstese de lado con las piernas extendidas. [address] caderas rectas glen el ejercicio. No permita que se balanceen hacia atrás. Apoye la buck sobre swift mano y coloque la otra mano en el suelo cerca de la cintura. 7. [place] lentamente la pierna superior hasta que esté más o menos en línea con swift hombro. Mantenga los dedos de los pies apuntando hacia adelante. 8. Baje la pierna lentamente hasta la posición inicial.  9. Repita de 8 a 12 veces. 10. Descanse un minuto y repita el ejercicio. 11. Voltéese del otro lado y jeremy el mismo ejercicio con la otra pierna. Postura de pie superficial con flexiones de la rodilla    6. Párese con las tre reposando en la [place] de un mesón o en alise silla frente a usted, con los pies separados a la altura de los hombros.  7. Doble lentamente las piernas para que se acuclille jami si se fuera a sentar en alise silla. Asegúrese de que seven rodillas no queden lalo de los dedos de los pies. 8. Agáchese unas 6 pulgadas (15 cm). Los talones deben permanecer en el suelo en todo momento. 9. Levántese lentamente hasta quedar enderezado. 10. Repita de 8 a 12 veces. 11. Descanse un minuto y repita el ejercicio. La atención de seguimiento es alise parte clave de swift tratamiento y seguridad. Asegúrese de hacer y acudir a todas las citas, y llame a swift médico si está teniendo problemas. También es alise buena idea saber los resultados de seven exámenes y mantener alise lista de los medicamentos que karen. ¿Dónde puede encontrar más información en inglés?   Hermelinda Bailon a http://my-saúl.info/. [address] en la búsqueda para aprender más acerca de \"Acondicionamiento muscular: Ejercicios - [ Muscle Conditioning: Exercises ]. \"  Revisado: 7 aron, 2017  Versión del contenido: 11.7  © 5397-8838 [employer]. Las instrucciones de cuidado fueron adaptadas bajo licencia por [employer] (which disclaims liability or warranty for this information). Si usted tiene [place] afección médica o sobre estas instrucciones, siempre pregunte a swift profesional de riik. [employer] niega toda garantía o responsabilidad por swift uso de esta información.

## 2018-09-25 NOTE — PROGRESS NOTES
Results for orders placed or performed in visit on 09/25/18   AMB POC GLUCOSE BLOOD, BY GLUCOSE MONITORING DEVICE   Result Value Ref Range    Glucose POC nf 149 mg/dL     Coordination of Care  1. Have you been to the ER, urgent care clinic since your last visit? Hospitalized since your last visit? No    2. Have you seen or consulted any other health care providers outside of the [address] [place] [address] since your last visit? Include any pap smears or colon screening. No    Does the patient need refills? YES    Learning Assessment Complete?  yes

## 2018-09-25 NOTE — PROGRESS NOTES
Assessment/Plan:       ICD-10-CM ICD-9-CM    1. Type 2 diabetes mellitus with hyperglycemia, with long-term current use of insulin (HCC) E11.65 250.00 AMB POC GLUCOSE BLOOD, BY GLUCOSE MONITORING DEVICE    Z79.4 790.29 HEMOGLOBIN A1C WITH EAG     V58.67 metFORMIN ER (GLUCOPHAGE XR) 500 mg tablet      insulin NPH (HUMULIN N NPH U-100 INSULIN) 100 unit/mL injection    she will continue to check glucose 3 times a day (this is what she prefers to do). And walk 7 days a week. 2 metformins with dinner. I am thinking she may not need any insulin and we can maintain on metformin. Follow-up Disposition:  Return for diabetes 2 or 3 months [facility]. [address] [place] [address], [address] [place] [address] E Call  [id number]  Phone: [phone] Fax: [phone]      [facility]  [address]  Subjective:     Chief Complaint   Patient presents with    Diabetes     f/u    Radha Mera is a 70 y.o. OTHER female who speaks Tristanian.  used? yes 100, 170 and in the evening 145 the highest is 170. Using 10 in the morning when it is above 100 and checks. If glucose is 100, doesn't use insulin. In middle of the day 2 hours after, 140 or 170 she takes a little. 170 she puts 10. When she goes to sleep she checks - 170 - 10 units. With the pills - she felt stuck at times, got back when she drank a diet coke. 220 if not using insulin. From last visit: She did not complete her PAP paperwork in June and has not responded. We gave her a sample vial in June. Plan to recheck A1c today. Her A1c harika a lot when she previously stopped insulin. ROS:   No increased frequency of urination, no increased thirst; no chest pain, dyspnea or TIA's; no numbness, tingling or pain in extremities; no reports of hypoglycemia. Diabetes   Physical Activity? Yes. Every evening, she walks 5 miles  Measuring glucoses? yes  .     Medications:    Current Outpatient Prescriptions:    metFORMIN ER (GLUCOPHAGE XR) 500 mg tablet, Take 2 Tabs by mouth daily (with dinner). For diabetes. [place] sig., Disp: 180 Tab, Rfl: 1    insulin NPH (HUMULIN N NPH U-100 INSULIN) 100 unit/mL injection, 10 Units by SubCUTAneous route every twelve (12) hours. As we discussed. [place] sig, Disp: 10 Vial, Rfl: 0    triamcinolone (NASACORT) 55 mcg nasal inhaler, 2 sprays in each nare QHS. 2 spray en cada fossa nasal cada noche., Disp: 16.5 g, Rfl: 11    loratadine (CLARITIN) 10 mg tablet, Take 1 Tab by mouth daily. [place] 1 tableta por boca diario., Disp: 90 Tab, Rfl: 3    lovastatin (MEVACOR) 20 mg tablet, Take 2 Tabs by mouth nightly., Disp: 60 Tab, Rfl: 3    naproxen (NAPROSYN) 500 mg tablet, Take 1 Tab by mouth two (2) times daily (with meals). [place] 1 pastilla dos veces al chiki con comida, Disp: 60 Tab, Rfl: 1    acetaminophen (TYLENOL ARTHRITIS) 650 mg CR tablet, 2 bid prn. [employer] veces al chiki si necesita para dolor. , Disp: 1 Each, Rfl: 5  Social History: She reports that she has never smoked. She has never used smokeless tobacco. She reports that she does not drink alcohol or use illicit drugs. Family History: Her family history is not on file. Objective:     Vitals:    09/25/18 1249   BP: 129/59   Pulse: 65   Temp: 98.4 °F (36.9 °C)   TempSrc: Oral   Weight: 149 lb (67.6 kg)    No LMP recorded. Patient is postmenopausal.    Results for orders placed or performed in visit on 09/25/18   AMB POC GLUCOSE BLOOD, BY GLUCOSE MONITORING DEVICE   Result Value Ref Range    Glucose POC nf 149 mg/dL   Results for orders placed or performed during the hospital encounter of 09/25/18   HEMOGLOBIN A1C WITH EAG   Result Value Ref Range    Hemoglobin A1c 7.3 (H) 4.2 - 6.3 %    Est. average glucose 163 mg/dL    She is not eating any meat, chicken. Wt Readings from Last 2 Encounters:   09/25/18 149 lb (67.6 kg)   06/09/18 153 lb 9.6 oz (69.7 kg)    Weight decreased;    Hemoglobin A1c   Date Value Ref Range Status   09/25/2018 7.3 (H) 4.2 - 6.3 % Final   05/17/2018 7.8 (H) 4.2 - 6.3 % Final    A1C increased;   Lab Results   Component Value Date/Time    Microalbumin/Creat ratio (mg/g creat)  12/05/2017 03:28 PM     Cannot calculate ratio due to microalbumin result outside reportable range. Microalbumin,urine random <0.50 12/05/2017 03:28 PM    Creatinine 0.57 02/18/2016 08:54 AM      Lab Results   Component Value Date/Time    GFR est AA >60 02/18/2016 08:54 AM    GFR est non-AA >60 02/18/2016 08:54 AM       Lab Results   Component Value Date/Time    Cholesterol, total 173 05/17/2018 08:38 AM    HDL Cholesterol 47 05/17/2018 08:38 AM    LDL,Direct 120 (H) 08/16/2012 12:23 PM    LDL, calculated 93.2 05/17/2018 08:38 AM    Triglyceride 164 (H) 05/17/2018 08:38 AM    CHOL/HDL Ratio 3.7 05/17/2018 08:38 AM      Lab Results   Component Value Date/Time    ALT (SGPT) 22 02/18/2016 08:54 AM    AST (SGOT) 12 (L) 02/18/2016 08:54 AM    Alk. phosphatase 76 02/18/2016 08:54 AM    Bilirubin, total 0.4 02/18/2016 08:54 AM     Constitutional: She appears well-developed. Eyes: EOM are normal. Pupils are equal, round, and reactive to light. Neck: Neck supple. No thyromegaly present. Cardiovascular: Normal rate, regular rhythm, normal heart sounds and intact distal pulses. No murmur heard. Pulmonary/Chest: Effort normal and breath sounds normal.   Musculoskeletal: She exhibits no edema. No ulcers of the lower extremities. Assessment/Plan:   Diagnoses and all orders for this visit:    1. Type 2 diabetes mellitus with hyperglycemia, with long-term current use of insulin (HCC)  -     AMB POC GLUCOSE BLOOD, BY GLUCOSE MONITORING DEVICE  -     HEMOGLOBIN A1C WITH EAG; Future  -     metFORMIN ER (GLUCOPHAGE XR) 500 mg tablet; Take 2 Tabs by mouth daily (with dinner). For diabetes. [place] sig.  -     insulin NPH (HUMULIN N NPH U-100 INSULIN) 100 unit/mL injection; 10 Units by SubCUTAneous route every twelve (12) hours.  As we discussed. British sig      Diabetes Mellitus type 2, under uncertain control. Blood pressure under Good control. Greater than 50% of this 25 minute visit was spent in face-to-face counseling/coordination of care regarding diabetes management. Follow-up Disposition:  Return for diabetes 2 or 3 months [facility]. Tony Antonio DNP, FNP-BC, BC-ADM  Jignesh Linares expressed understanding of this plan.

## 2018-09-26 LAB
EST. AVERAGE GLUCOSE BLD GHB EST-MCNC: 163 MG/DL
HBA1C MFR BLD: 7.3 % (ref 4.2–6.3)

## 2018-09-27 ENCOUNTER — PATIENT OUTREACH (OUTPATIENT)
Dept: FAMILY MEDICINE CLINIC | Age: 63
End: 2018-09-27

## 2018-10-04 ENCOUNTER — PATIENT OUTREACH (OUTPATIENT)
Dept: FAMILY MEDICINE CLINIC | Age: 63
End: 2018-10-04

## 2018-10-04 NOTE — LETTER
10/4/2018 11:14 AM 
 
 
 
Ms. Nilesh Hale [address] [address] Mi nombre es Lizzie Doe, MABEL y soy alise enfermera. Por favor de llamarme al 21 . Bertrand Austin, Joshua Armstrong, MABEL Vasques RN, CMSRN  Nurse Navigator, Respecting Choices® [facility] Facilitator [address] [facility] [address] [facility] [address] [place] [address], [address] [facility] 
[phone] (w)  [phone]. (c)  [phone] (f) [email or web address]

## 2018-10-04 NOTE — PROGRESS NOTES
10/4/18  Niurka Osman is cheking blood glucose at home and she states that her glucose is  fasting. Niurka has decreased the CHO's she eats , eats plenty of veggies and does drink more water. She will call for a FU appointment t with Blaine Thayer in November. Niurka states that she feels great now. NO more sleepiness, tired and weak. [address]   Chronic Disease Management Nurse Navigator Note    Diagnosis: Diabetes Mellitus    Adherence - PCP: YES  Adherence - Medications: compliant with all meds  Barriers: None    ED visit with reachout: NO  If ED visit with reachout, plan: Na  If ED visit without reachout, details: Na      DM  Neuropathic pain: no  Vision disturbance: no  Polyuria:no  Polydipsia: no  24 hour dietary recall:  Smaler portions, more vegetables.     Time in discussion: 25 minutes

## 2018-10-04 NOTE — PROGRESS NOTES
Goals      Establish PCP relationships and regularly scheduled appointments. 10/4/18  Niurka Osman will keep FU with Provider and report any barriers to NN. FU in 2 weeks. IM       Knowledge and adherence of medication (ie. action, side effects, missed dose, etc.)            10/4/18  Jignesh Linares will take medications as ordered and report any side effects or barriers to NN. FU in 2 weeks. IM       Patient verbalizes understanding of self -management goals of living with Diabetes. 10/4/18   Niurka Osman will check her BS daily and report high or low BS to NN/ PCP. FU in 2 weeks.  IM

## 2018-10-25 ENCOUNTER — PATIENT OUTREACH (OUTPATIENT)
Dept: FAMILY MEDICINE CLINIC | Age: 63
End: 2018-10-25

## 2018-11-06 NOTE — PROGRESS NOTES
10/24/18  NN called Sidney Flynn and left a message. 11/6/18  NN called Sidney Flynn and left a message. 12/4/18  NN called Niurka Osman and left message. Encounter closed.

## 2021-04-03 ENCOUNTER — APPOINTMENT (OUTPATIENT)
Dept: GENERAL RADIOLOGY | Age: 66
End: 2021-04-03
Attending: EMERGENCY MEDICINE

## 2021-04-03 ENCOUNTER — HOSPITAL ENCOUNTER (EMERGENCY)
Age: 66
Discharge: HOME | End: 2021-04-03
Attending: EMERGENCY MEDICINE

## 2021-04-03 VITALS
SYSTOLIC BLOOD PRESSURE: 85 MMHG | TEMPERATURE: 98.1 F | DIASTOLIC BLOOD PRESSURE: 74 MMHG | BODY MASS INDEX: 28.53 KG/M2 | HEART RATE: 82 BPM | RESPIRATION RATE: 21 BRPM | OXYGEN SATURATION: 97 % | WEIGHT: 145.3 LBS

## 2021-04-03 DIAGNOSIS — R25.1 SHAKINESS: ICD-10-CM

## 2021-04-03 DIAGNOSIS — R07.89 ATYPICAL CHEST PAIN: ICD-10-CM

## 2021-04-03 DIAGNOSIS — R73.9 HYPERGLYCEMIA: ICD-10-CM

## 2021-04-03 DIAGNOSIS — E87.6 HYPOKALEMIA: Primary | ICD-10-CM

## 2021-04-03 LAB
ALBUMIN SERPL-MCNC: 3.6 G/DL (ref 3.5–5)
ALBUMIN/GLOB SERPL: 0.9 {RATIO} (ref 1.1–2.2)
ALP SERPL-CCNC: 84 U/L (ref 45–117)
ALT SERPL-CCNC: 27 U/L (ref 12–78)
ANION GAP SERPL CALC-SCNC: 8 MMOL/L (ref 5–15)
AST SERPL-CCNC: 17 U/L (ref 15–37)
BASOPHILS # BLD: 0.1 K/UL (ref 0–0.1)
BASOPHILS NFR BLD: 1 % (ref 0–1)
BILIRUB SERPL-MCNC: 0.2 MG/DL (ref 0.2–1)
BUN SERPL-MCNC: 9 MG/DL (ref 6–20)
BUN/CREAT SERPL: 13 (ref 12–20)
CALCIUM SERPL-MCNC: 8.2 MG/DL (ref 8.5–10.1)
CHLORIDE SERPL-SCNC: 103 MMOL/L (ref 97–108)
CO2 SERPL-SCNC: 26 MMOL/L (ref 21–32)
COMMENT, HOLDF: NORMAL
CREAT SERPL-MCNC: 0.69 MG/DL (ref 0.55–1.02)
DIFFERENTIAL METHOD BLD: ABNORMAL
EOSINOPHIL # BLD: 0.2 K/UL (ref 0–0.4)
EOSINOPHIL NFR BLD: 3 % (ref 0–7)
ERYTHROCYTE [DISTWIDTH] IN BLOOD BY AUTOMATED COUNT: 13.2 % (ref 11.5–14.5)
GLOBULIN SER CALC-MCNC: 3.8 G/DL (ref 2–4)
GLUCOSE BLD STRIP.AUTO-MCNC: 243 MG/DL (ref 65–100)
GLUCOSE SERPL-MCNC: 236 MG/DL (ref 65–100)
HCT VFR BLD AUTO: 36.5 % (ref 35–47)
HGB BLD-MCNC: 11.8 G/DL (ref 11.5–16)
IMM GRANULOCYTES # BLD AUTO: 0.1 K/UL (ref 0–0.04)
IMM GRANULOCYTES NFR BLD AUTO: 1 % (ref 0–0.5)
LYMPHOCYTES # BLD: 1.9 K/UL (ref 0.8–3.5)
LYMPHOCYTES NFR BLD: 28 % (ref 12–49)
MCH RBC QN AUTO: 27.4 PG (ref 26–34)
MCHC RBC AUTO-ENTMCNC: 32.3 G/DL (ref 30–36.5)
MCV RBC AUTO: 84.9 FL (ref 80–99)
MONOCYTES # BLD: 0.4 K/UL (ref 0–1)
MONOCYTES NFR BLD: 7 % (ref 5–13)
NEUTS SEG # BLD: 4.1 K/UL (ref 1.8–8)
NEUTS SEG NFR BLD: 60 % (ref 32–75)
NRBC # BLD: 0 K/UL (ref 0–0.01)
NRBC BLD-RTO: 0 PER 100 WBC
PLATELET # BLD AUTO: 284 K/UL (ref 150–400)
PMV BLD AUTO: 9.1 FL (ref 8.9–12.9)
POTASSIUM SERPL-SCNC: 2.7 MMOL/L (ref 3.5–5.1)
PROT SERPL-MCNC: 7.4 G/DL (ref 6.4–8.2)
RBC # BLD AUTO: 4.3 M/UL (ref 3.8–5.2)
SAMPLES BEING HELD,HOLD: NORMAL
SERVICE CMNT-IMP: ABNORMAL
SODIUM SERPL-SCNC: 137 MMOL/L (ref 136–145)
TROPONIN I SERPL-MCNC: <0.05 NG/ML
WBC # BLD AUTO: 6.8 K/UL (ref 3.6–11)

## 2021-04-03 PROCEDURE — 96365 THER/PROPH/DIAG IV INF INIT: CPT

## 2021-04-03 PROCEDURE — 71045 X-RAY EXAM CHEST 1 VIEW: CPT

## 2021-04-03 PROCEDURE — 93005 ELECTROCARDIOGRAM TRACING: CPT

## 2021-04-03 PROCEDURE — 74011250637 HC RX REV CODE- 250/637: Performed by: EMERGENCY MEDICINE

## 2021-04-03 PROCEDURE — 96361 HYDRATE IV INFUSION ADD-ON: CPT

## 2021-04-03 PROCEDURE — 36415 COLL VENOUS BLD VENIPUNCTURE: CPT

## 2021-04-03 PROCEDURE — 84484 ASSAY OF TROPONIN QUANT: CPT

## 2021-04-03 PROCEDURE — 85025 COMPLETE CBC W/AUTO DIFF WBC: CPT

## 2021-04-03 PROCEDURE — 82962 GLUCOSE BLOOD TEST: CPT

## 2021-04-03 PROCEDURE — 74011250636 HC RX REV CODE- 250/636: Performed by: EMERGENCY MEDICINE

## 2021-04-03 PROCEDURE — 80053 COMPREHEN METABOLIC PANEL: CPT

## 2021-04-03 PROCEDURE — 99285 EMERGENCY DEPT VISIT HI MDM: CPT

## 2021-04-03 RX ORDER — POTASSIUM CHLORIDE 750 MG/1
40 TABLET, FILM COATED, EXTENDED RELEASE ORAL
Status: COMPLETED | OUTPATIENT
Start: 2021-04-03 | End: 2021-04-03

## 2021-04-03 RX ORDER — POTASSIUM CHLORIDE 7.45 MG/ML
10 INJECTION INTRAVENOUS
Status: COMPLETED | OUTPATIENT
Start: 2021-04-03 | End: 2021-04-03

## 2021-04-03 RX ADMIN — POTASSIUM CHLORIDE 10 MEQ: 10 INJECTION, SOLUTION INTRAVENOUS at 21:52

## 2021-04-03 RX ADMIN — POTASSIUM CHLORIDE 40 MEQ: 750 TABLET, EXTENDED RELEASE ORAL at 21:52

## 2021-04-03 RX ADMIN — SODIUM CHLORIDE 1000 ML: 9 INJECTION, SOLUTION INTRAVENOUS at 21:53

## 2021-04-04 NOTE — ED NOTES
Dr. Greg Askew reviewed discharge instructions with the patient. The patient verbalized understanding. The patient was given opportunity for questions. Patient discharged in stable condition to the waiting room via ambulatory.
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
## 2021-04-06 LAB
ATRIAL RATE: 99 BPM
CALCULATED P AXIS, ECG09: 59 DEGREES
CALCULATED R AXIS, ECG10: -3 DEGREES
CALCULATED T AXIS, ECG11: 40 DEGREES
DIAGNOSIS, 93000: NORMAL
P-R INTERVAL, ECG05: 200 MS
Q-T INTERVAL, ECG07: 370 MS
QRS DURATION, ECG06: 88 MS
QTC CALCULATION (BEZET), ECG08: 474 MS
VENTRICULAR RATE, ECG03: 99 BPM

## 2022-03-19 PROBLEM — F33.9 RECURRENT DEPRESSION (HCC): Status: ACTIVE | Noted: 2018-01-16

## 2024-01-11 ENCOUNTER — HOSPITAL ENCOUNTER (EMERGENCY)
Facility: HOSPITAL | Age: 69
Discharge: HOME | End: 2024-01-11
Attending: EMERGENCY MEDICINE
Payer: OTHER MISCELLANEOUS

## 2024-01-11 ENCOUNTER — APPOINTMENT (OUTPATIENT)
Facility: HOSPITAL | Age: 69
End: 2024-01-11
Payer: OTHER MISCELLANEOUS

## 2024-01-11 VITALS
OXYGEN SATURATION: 97 % | HEART RATE: 97 BPM | WEIGHT: 148 LBS | SYSTOLIC BLOOD PRESSURE: 151 MMHG | HEIGHT: 61 IN | BODY MASS INDEX: 27.94 KG/M2 | RESPIRATION RATE: 15 BRPM | DIASTOLIC BLOOD PRESSURE: 68 MMHG | TEMPERATURE: 98 F

## 2024-01-11 DIAGNOSIS — S20.219A CONTUSION OF CHEST WALL, UNSPECIFIED LATERALITY, INITIAL ENCOUNTER: ICD-10-CM
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
## 2024-01-11 DIAGNOSIS — S16.1XXA ACUTE STRAIN OF NECK MUSCLE, INITIAL ENCOUNTER: ICD-10-CM

## 2024-01-11 DIAGNOSIS — S09.90XA CLOSED HEAD INJURY, INITIAL ENCOUNTER: ICD-10-CM

## 2024-01-11 LAB
ABO + RH BLD: NORMAL
ALBUMIN SERPL-MCNC: 3.8 G/DL (ref 3.5–5)
ALBUMIN/GLOB SERPL: 1.1 (ref 1.1–2.2)
ALP SERPL-CCNC: 88 U/L (ref 45–117)
ALT SERPL-CCNC: 24 U/L (ref 12–78)
ANION GAP SERPL CALC-SCNC: 8 MMOL/L (ref 5–15)
AST SERPL W P-5'-P-CCNC: 13 U/L (ref 15–37)
BASOPHILS # BLD: 0 K/UL (ref 0–0.1)
BASOPHILS NFR BLD: 1 % (ref 0–1)
BILIRUB SERPL-MCNC: 0.3 MG/DL (ref 0.2–1)
BLOOD GROUP ANTIBODIES SERPL: NEGATIVE
BNP SERPL-MCNC: 93 PG/ML
BUN SERPL-MCNC: 12 MG/DL (ref 6–20)
BUN/CREAT SERPL: 15 (ref 12–20)
CA-I BLD-MCNC: 9.4 MG/DL (ref 8.5–10.1)
CHLORIDE SERPL-SCNC: 105 MMOL/L (ref 97–108)
CO2 SERPL-SCNC: 26 MMOL/L (ref 21–32)
CREAT SERPL-MCNC: 0.79 MG/DL (ref 0.55–1.02)
DIFFERENTIAL METHOD BLD: ABNORMAL
EKG ATRIAL RATE: 97 BPM
EKG DIAGNOSIS: NORMAL
EKG P AXIS: 59 DEGREES
EKG P-R INTERVAL: 200 MS
EKG Q-T INTERVAL: 352 MS
EKG QRS DURATION: 86 MS
EKG QTC CALCULATION (BAZETT): 447 MS
EKG R AXIS: 9 DEGREES
EKG T AXIS: 50 DEGREES
EKG VENTRICULAR RATE: 97 BPM
EOSINOPHIL # BLD: 0.1 K/UL (ref 0–0.4)
EOSINOPHIL NFR BLD: 1 % (ref 0–7)
ERYTHROCYTE [DISTWIDTH] IN BLOOD BY AUTOMATED COUNT: 13.2 % (ref 11.5–14.5)
ETHANOL SERPL-MCNC: <10 MG/DL (ref 0–0.08)
GLOBULIN SER CALC-MCNC: 3.4 G/DL (ref 2–4)
GLUCOSE SERPL-MCNC: 374 MG/DL (ref 65–100)
HCT VFR BLD AUTO: 38.1 % (ref 35–47)
HGB BLD-MCNC: 12.3 G/DL (ref 11.5–16)
IMM GRANULOCYTES # BLD AUTO: 0.1 K/UL (ref 0–0.04)
IMM GRANULOCYTES NFR BLD AUTO: 1 % (ref 0–0.5)
LYMPHOCYTES # BLD: 1.5 K/UL (ref 0.8–3.5)
LYMPHOCYTES NFR BLD: 18 % (ref 12–49)
MAGNESIUM SERPL-MCNC: 2 MG/DL (ref 1.6–2.4)
MCH RBC QN AUTO: 27.1 PG (ref 26–34)
MCHC RBC AUTO-ENTMCNC: 32.3 G/DL (ref 30–36.5)
MCV RBC AUTO: 83.9 FL (ref 80–99)
MONOCYTES # BLD: 0.4 K/UL (ref 0–1)
MONOCYTES NFR BLD: 4 % (ref 5–13)
NEUTS SEG # BLD: 6.6 K/UL (ref 1.8–8)
NEUTS SEG NFR BLD: 75 % (ref 32–75)
NRBC # BLD: 0 K/UL (ref 0–0.01)
NRBC BLD-RTO: 0 PER 100 WBC
PLATELET # BLD AUTO: 313 K/UL (ref 150–400)
PMV BLD AUTO: 9.5 FL (ref 8.9–12.9)
POTASSIUM SERPL-SCNC: 3.8 MMOL/L (ref 3.5–5.1)
PROT SERPL-MCNC: 7.2 G/DL (ref 6.4–8.2)
RBC # BLD AUTO: 4.54 M/UL (ref 3.8–5.2)
SODIUM SERPL-SCNC: 139 MMOL/L (ref 136–145)
SPECIMEN EXP DATE BLD: NORMAL
TROPONIN I SERPL HS-MCNC: 4 NG/L (ref 0–51)
WBC # BLD AUTO: 8.7 K/UL (ref 3.6–11)

## 2024-01-11 PROCEDURE — 99285 EMERGENCY DEPT VISIT HI MDM: CPT

## 2024-01-11 PROCEDURE — 80053 COMPREHEN METABOLIC PANEL: CPT

## 2024-01-11 PROCEDURE — 71045 X-RAY EXAM CHEST 1 VIEW: CPT

## 2024-01-11 PROCEDURE — 84484 ASSAY OF TROPONIN QUANT: CPT

## 2024-01-11 PROCEDURE — 6830039000 HC L3 TRAUMA ALERT

## 2024-01-11 PROCEDURE — 6370000000 HC RX 637 (ALT 250 FOR IP): Performed by: EMERGENCY MEDICINE

## 2024-01-11 PROCEDURE — 82077 ASSAY SPEC XCP UR&BREATH IA: CPT

## 2024-01-11 PROCEDURE — 74177 CT ABD & PELVIS W/CONTRAST: CPT

## 2024-01-11 PROCEDURE — 93005 ELECTROCARDIOGRAM TRACING: CPT | Performed by: EMERGENCY MEDICINE

## 2024-01-11 PROCEDURE — 83880 ASSAY OF NATRIURETIC PEPTIDE: CPT

## 2024-01-11 PROCEDURE — 6360000004 HC RX CONTRAST MEDICATION: Performed by: EMERGENCY MEDICINE

## 2024-01-11 PROCEDURE — 72125 CT NECK SPINE W/O DYE: CPT

## 2024-01-11 PROCEDURE — 86850 RBC ANTIBODY SCREEN: CPT

## 2024-01-11 PROCEDURE — 85025 COMPLETE CBC W/AUTO DIFF WBC: CPT

## 2024-01-11 PROCEDURE — 83735 ASSAY OF MAGNESIUM: CPT

## 2024-01-11 PROCEDURE — 72170 X-RAY EXAM OF PELVIS: CPT

## 2024-01-11 PROCEDURE — 86900 BLOOD TYPING SEROLOGIC ABO: CPT

## 2024-01-11 PROCEDURE — 70450 CT HEAD/BRAIN W/O DYE: CPT

## 2024-01-11 PROCEDURE — 36415 COLL VENOUS BLD VENIPUNCTURE: CPT

## 2024-01-11 PROCEDURE — 86901 BLOOD TYPING SEROLOGIC RH(D): CPT

## 2024-01-11 RX ORDER — ACETAMINOPHEN 500 MG
1000 TABLET ORAL
Status: COMPLETED | OUTPATIENT
Start: 2024-01-11 | End: 2024-01-11

## 2024-01-11 RX ORDER — FENTANYL CITRATE 50 UG/ML
25 INJECTION, SOLUTION INTRAMUSCULAR; INTRAVENOUS
Status: DISCONTINUED | OUTPATIENT
Start: 2024-01-11 | End: 2024-01-11 | Stop reason: HOSPADM

## 2024-01-11 RX ORDER — ONDANSETRON 2 MG/ML
4 INJECTION INTRAMUSCULAR; INTRAVENOUS ONCE
Status: DISCONTINUED | OUTPATIENT
Start: 2024-01-11 | End: 2024-01-11 | Stop reason: HOSPADM

## 2024-01-11 RX ADMIN — IOPAMIDOL 100 ML: 755 INJECTION, SOLUTION INTRAVENOUS at 11:14

## 2024-01-11 RX ADMIN — ACETAMINOPHEN 1000 MG: 500 TABLET ORAL at 12:35

## 2024-01-11 ASSESSMENT — LIFESTYLE VARIABLES
HOW OFTEN DO YOU HAVE A DRINK CONTAINING ALCOHOL: NEVER
HOW MANY STANDARD DRINKS CONTAINING ALCOHOL DO YOU HAVE ON A TYPICAL DAY: PATIENT DOES NOT DRINK

## 2024-01-11 ASSESSMENT — PAIN - FUNCTIONAL ASSESSMENT: PAIN_FUNCTIONAL_ASSESSMENT: 0-10

## 2024-01-11 ASSESSMENT — PAIN SCALES - GENERAL: PAINLEVEL_OUTOF10: 10
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
## 2024-01-11 NOTE — ED NOTES
Discharge instructions reviewed with the patient and daughter at bedside and appropriate educational materials and side effects teaching were provided.
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
## 2024-01-11 NOTE — DISCHARGE INSTRUCTIONS
Thank you!  Thank you for allowing me to care for you in the emergency department. It is my goal to provide you with excellent care. If you have not received excellent quality care, please ask to speak to the nurse manager. Please fill out the survey that will come to you by mail or email since we listen to your feedback!     Below you will find a list of your tests from today's visit.  Should you have any questions, please do not hesitate to call the emergency department.    Labs  Recent Results (from the past 12 hour(s))   CBC with Auto Differential    Collection Time: 01/11/24 10:46 AM   Result Value Ref Range    WBC 8.7 3.6 - 11.0 K/uL    RBC 4.54 3.80 - 5.20 M/uL    Hemoglobin 12.3 11.5 - 16.0 g/dL    Hematocrit 38.1 35.0 - 47.0 %    MCV 83.9 80.0 - 99.0 FL    MCH 27.1 26.0 - 34.0 PG    MCHC 32.3 30.0 - 36.5 g/dL    RDW 13.2 11.5 - 14.5 %    Platelets 313 150 - 400 K/uL    MPV 9.5 8.9 - 12.9 FL    Nucleated RBCs 0.0 0.0  WBC    nRBC 0.00 0.00 - 0.01 K/uL    Neutrophils % 75 32 - 75 %    Lymphocytes % 18 12 - 49 %    Monocytes % 4 (L) 5 - 13 %    Eosinophils % 1 0 - 7 %    Basophils % 1 0 - 1 %    Immature Granulocytes 1 (H) 0 - 0.5 %    Neutrophils Absolute 6.6 1.8 - 8.0 K/UL    Lymphocytes Absolute 1.5 0.8 - 3.5 K/UL    Monocytes Absolute 0.4 0.0 - 1.0 K/UL    Eosinophils Absolute 0.1 0.0 - 0.4 K/UL    Basophils Absolute 0.0 0.0 - 0.1 K/UL    Absolute Immature Granulocyte 0.1 (H) 0.00 - 0.04 K/UL    Differential Type AUTOMATED     CMP    Collection Time: 01/11/24 10:46 AM   Result Value Ref Range    Sodium 139 136 - 145 mmol/L    Potassium 3.8 3.5 - 5.1 mmol/L    Chloride 105 97 - 108 mmol/L    CO2 26 21 - 32 mmol/L    Anion Gap 8 5 - 15 mmol/L    Glucose 374 (H) 65 - 100 mg/dL    BUN 12 6 - 20 mg/dL    Creatinine 0.79 0.55 - 1.02 mg/dL    Bun/Cre Ratio 15 12 - 20      Est, Glom Filt Rate >60 >60 ml/min/1.73m2    Calcium 9.4 8.5 - 10.1 mg/dL    Total Bilirubin 0.3 0.2 - 1.0 mg/dL    AST 13 (L) 15 -  37 U/L    ALT 24 12 - 78 U/L    Alk Phosphatase 88 45 - 117 U/L    Total Protein 7.2 6.4 - 8.2 g/dL    Albumin 3.8 3.5 - 5.0 g/dL    Globulin 3.4 2.0 - 4.0 g/dL    Albumin/Globulin Ratio 1.1 1.1 - 2.2     Magnesium    Collection Time: 01/11/24 10:46 AM   Result Value Ref Range    Magnesium 2.0 1.6 - 2.4 mg/dL   ETOH    Collection Time: 01/11/24 10:46 AM   Result Value Ref Range    Ethanol Lvl <10 <10 mg/dL   Brain Natriuretic Peptide    Collection Time: 01/11/24 10:46 AM   Result Value Ref Range    NT Pro-BNP 93 <125 pg/mL   TYPE AND SCREEN    Collection Time: 01/11/24 10:46 AM   Result Value Ref Range    Crossmatch expiration date 01/14/2024,2359     ABO/Rh A Positive     Antibody Screen Negative    Troponin    Collection Time: 01/11/24 10:47 AM   Result Value Ref Range    Troponin, High Sensitivity 4 0 - 51 ng/L       Radiologic Studies  CT Head W/O Contrast   Final Result   1. No evidence of acute intracranial abnormality.         CT CSpine W/O Contrast   Final Result   No acute abnormality.      CT CHEST ABDOMEN PELVIS W CONTRAST Additional Contrast? None   Final Result   No acute abnormality.      XR CHEST PORTABLE   Final Result   No acute process.      XR PELVIS (1-2 VIEWS)   Final Result   No acute abnormality.        ------------------------------------------------------------------------------------------------------------  The exam and treatment you received in the Emergency Department were for an urgent problem and are not intended as complete care. It is important that you follow-up with a doctor, nurse practitioner, or physician assistant to:  (1) confirm your diagnosis,  (2) re-evaluation of changes in your illness and treatment, and  (3) for ongoing care. Please take your discharge instructions with you when you go to your follow-up appointment.     If you have any problem arranging a follow-up appointment, contact the Emergency Department.  If your symptoms become worse or you do not improve as
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
## 2025-08-14 ENCOUNTER — TRANSCRIBE ORDERS (OUTPATIENT)
Facility: HOSPITAL | Age: 70
End: 2025-08-14

## 2025-08-14 DIAGNOSIS — R10.13 ABDOMINAL PAIN, EPIGASTRIC: Primary | ICD-10-CM

## 2025-08-14 DIAGNOSIS — K21.9 GASTROESOPHAGEAL REFLUX DISEASE, UNSPECIFIED WHETHER ESOPHAGITIS PRESENT: ICD-10-CM

## 2025-08-14 DIAGNOSIS — R19.00 ABDOMINAL MASS, UNSPECIFIED ABDOMINAL LOCATION: ICD-10-CM
